# Patient Record
Sex: FEMALE | Race: WHITE
[De-identification: names, ages, dates, MRNs, and addresses within clinical notes are randomized per-mention and may not be internally consistent; named-entity substitution may affect disease eponyms.]

---

## 2017-07-10 ENCOUNTER — RECORD ABSTRACTING (OUTPATIENT)
Age: 57
End: 2017-07-10

## 2017-07-10 DIAGNOSIS — I65.22 OCCLUSION AND STENOSIS OF LEFT CAROTID ARTERY: ICD-10-CM

## 2017-07-10 DIAGNOSIS — Z77.22 CONTACT WITH AND (SUSPECTED) EXPOSURE TO ENVIRONMENTAL TOBACCO SMOKE (ACUTE) (CHRONIC): ICD-10-CM

## 2017-07-10 DIAGNOSIS — K63.5 POLYP OF COLON: ICD-10-CM

## 2017-07-10 DIAGNOSIS — R07.89 OTHER CHEST PAIN: ICD-10-CM

## 2017-07-11 ENCOUNTER — APPOINTMENT (OUTPATIENT)
Dept: INTERNAL MEDICINE | Facility: CLINIC | Age: 57
End: 2017-07-11

## 2017-07-11 VITALS
RESPIRATION RATE: 14 BRPM | WEIGHT: 125.99 LBS | SYSTOLIC BLOOD PRESSURE: 130 MMHG | HEART RATE: 90 BPM | DIASTOLIC BLOOD PRESSURE: 88 MMHG | HEIGHT: 66 IN | TEMPERATURE: 97.9 F | OXYGEN SATURATION: 96 % | BODY MASS INDEX: 20.25 KG/M2

## 2017-07-11 DIAGNOSIS — R00.2 PALPITATIONS: ICD-10-CM

## 2017-08-30 ENCOUNTER — APPOINTMENT (OUTPATIENT)
Dept: INTERNAL MEDICINE | Facility: CLINIC | Age: 57
End: 2017-08-30

## 2017-09-07 ENCOUNTER — APPOINTMENT (OUTPATIENT)
Dept: INTERNAL MEDICINE | Facility: CLINIC | Age: 57
End: 2017-09-07
Payer: COMMERCIAL

## 2017-09-07 VITALS
RESPIRATION RATE: 16 BRPM | WEIGHT: 126 LBS | OXYGEN SATURATION: 95 % | DIASTOLIC BLOOD PRESSURE: 82 MMHG | SYSTOLIC BLOOD PRESSURE: 120 MMHG | TEMPERATURE: 98.1 F | BODY MASS INDEX: 20.25 KG/M2 | HEIGHT: 66 IN | HEART RATE: 80 BPM

## 2017-09-07 DIAGNOSIS — Z78.9 OTHER SPECIFIED HEALTH STATUS: ICD-10-CM

## 2017-09-07 DIAGNOSIS — Z83.3 FAMILY HISTORY OF DIABETES MELLITUS: ICD-10-CM

## 2017-09-07 DIAGNOSIS — R06.02 SHORTNESS OF BREATH: ICD-10-CM

## 2017-09-07 DIAGNOSIS — I10 ESSENTIAL (PRIMARY) HYPERTENSION: ICD-10-CM

## 2017-09-07 PROCEDURE — 94729 DIFFUSING CAPACITY: CPT

## 2017-09-07 PROCEDURE — 94727 GAS DIL/WSHOT DETER LNG VOL: CPT

## 2017-09-07 PROCEDURE — 99214 OFFICE O/P EST MOD 30 MIN: CPT | Mod: 25

## 2017-09-07 PROCEDURE — 94060 EVALUATION OF WHEEZING: CPT

## 2017-10-30 ENCOUNTER — RX RENEWAL (OUTPATIENT)
Age: 57
End: 2017-10-30

## 2017-12-12 ENCOUNTER — APPOINTMENT (OUTPATIENT)
Dept: INTERNAL MEDICINE | Facility: CLINIC | Age: 57
End: 2017-12-12

## 2017-12-14 ENCOUNTER — RESULT CHARGE (OUTPATIENT)
Age: 57
End: 2017-12-14

## 2017-12-14 ENCOUNTER — APPOINTMENT (OUTPATIENT)
Dept: INTERNAL MEDICINE | Facility: CLINIC | Age: 57
End: 2017-12-14
Payer: COMMERCIAL

## 2017-12-14 VITALS
DIASTOLIC BLOOD PRESSURE: 82 MMHG | HEIGHT: 66 IN | RESPIRATION RATE: 14 BRPM | WEIGHT: 126 LBS | BODY MASS INDEX: 20.25 KG/M2 | SYSTOLIC BLOOD PRESSURE: 116 MMHG | HEART RATE: 97 BPM | OXYGEN SATURATION: 96 % | TEMPERATURE: 98.2 F

## 2017-12-14 DIAGNOSIS — F51.01 PRIMARY INSOMNIA: ICD-10-CM

## 2017-12-14 LAB — S PYO AG SPEC QL IA: NORMAL

## 2017-12-14 PROCEDURE — 99213 OFFICE O/P EST LOW 20 MIN: CPT | Mod: 25

## 2017-12-14 PROCEDURE — 87880 STREP A ASSAY W/OPTIC: CPT | Mod: QW

## 2018-02-20 ENCOUNTER — RX RENEWAL (OUTPATIENT)
Age: 58
End: 2018-02-20

## 2018-02-26 ENCOUNTER — OTHER (OUTPATIENT)
Age: 58
End: 2018-02-26

## 2018-03-01 ENCOUNTER — APPOINTMENT (OUTPATIENT)
Dept: INTERNAL MEDICINE | Facility: CLINIC | Age: 58
End: 2018-03-01
Payer: COMMERCIAL

## 2018-03-01 VITALS
HEIGHT: 66 IN | TEMPERATURE: 98 F | BODY MASS INDEX: 20.57 KG/M2 | SYSTOLIC BLOOD PRESSURE: 122 MMHG | DIASTOLIC BLOOD PRESSURE: 80 MMHG | RESPIRATION RATE: 16 BRPM | OXYGEN SATURATION: 95 % | WEIGHT: 128 LBS | HEART RATE: 92 BPM

## 2018-03-01 PROCEDURE — 99213 OFFICE O/P EST LOW 20 MIN: CPT | Mod: 25

## 2018-03-01 PROCEDURE — 99396 PREV VISIT EST AGE 40-64: CPT

## 2018-03-01 RX ORDER — AZITHROMYCIN DIHYDRATE 250 MG/1
250 TABLET, FILM COATED ORAL
Qty: 1 | Refills: 0 | Status: DISCONTINUED | COMMUNITY
Start: 2017-12-14 | End: 2018-03-01

## 2018-03-01 RX ORDER — B-COMPLEX WITH VITAMIN C
TABLET ORAL
Refills: 0 | Status: COMPLETED | COMMUNITY
End: 2018-03-01

## 2018-03-01 RX ORDER — COLD-HOT PACK
125 MCG EACH MISCELLANEOUS
Refills: 0 | Status: COMPLETED | COMMUNITY
End: 2018-03-01

## 2018-04-25 ENCOUNTER — APPOINTMENT (OUTPATIENT)
Dept: INTERNAL MEDICINE | Facility: CLINIC | Age: 58
End: 2018-04-25
Payer: COMMERCIAL

## 2018-04-25 VITALS
RESPIRATION RATE: 14 BRPM | WEIGHT: 128 LBS | HEART RATE: 78 BPM | HEIGHT: 66 IN | TEMPERATURE: 98.4 F | BODY MASS INDEX: 20.57 KG/M2 | DIASTOLIC BLOOD PRESSURE: 88 MMHG | SYSTOLIC BLOOD PRESSURE: 128 MMHG | OXYGEN SATURATION: 96 %

## 2018-04-25 LAB — S PYO AG SPEC QL IA: NORMAL

## 2018-04-25 PROCEDURE — 99213 OFFICE O/P EST LOW 20 MIN: CPT | Mod: 25

## 2018-04-25 PROCEDURE — 87880 STREP A ASSAY W/OPTIC: CPT | Mod: QW

## 2018-05-03 PROBLEM — Z87.09 HISTORY OF ACUTE PHARYNGITIS: Status: RESOLVED | Noted: 2018-04-25 | Resolved: 2018-05-03

## 2018-05-03 PROBLEM — Z87.828 HISTORY OF MOTOR VEHICLE ACCIDENT: Status: RESOLVED | Noted: 2017-07-10 | Resolved: 2018-05-03

## 2018-05-03 PROBLEM — Z87.09 HISTORY OF SINUSITIS: Status: RESOLVED | Noted: 2018-04-25 | Resolved: 2018-05-03

## 2018-05-03 PROBLEM — B00.9 HSV INFECTION: Status: ACTIVE | Noted: 2018-05-03

## 2018-05-03 RX ORDER — IBUPROFEN 600 MG/1
600 TABLET, FILM COATED ORAL
Qty: 20 | Refills: 0 | Status: COMPLETED | COMMUNITY
Start: 2018-03-05 | End: 2018-05-03

## 2018-05-03 RX ORDER — AMOXICILLIN 500 MG/1
500 CAPSULE ORAL
Qty: 20 | Refills: 0 | Status: COMPLETED | COMMUNITY
Start: 2018-03-05 | End: 2018-05-03

## 2018-05-03 RX ORDER — CHLORHEXIDINE GLUCONATE, 0.12% ORAL RINSE 1.2 MG/ML
0.12 SOLUTION DENTAL
Qty: 473 | Refills: 0 | Status: COMPLETED | COMMUNITY
Start: 2018-03-05 | End: 2018-05-03

## 2018-05-03 RX ORDER — AZITHROMYCIN 250 MG/1
250 TABLET, FILM COATED ORAL
Qty: 1 | Refills: 0 | Status: COMPLETED | COMMUNITY
Start: 2018-04-25 | End: 2018-05-03

## 2018-05-04 ENCOUNTER — NON-APPOINTMENT (OUTPATIENT)
Age: 58
End: 2018-05-04

## 2018-05-04 ENCOUNTER — APPOINTMENT (OUTPATIENT)
Dept: INTERNAL MEDICINE | Facility: CLINIC | Age: 58
End: 2018-05-04
Payer: COMMERCIAL

## 2018-05-04 ENCOUNTER — OTHER (OUTPATIENT)
Age: 58
End: 2018-05-04

## 2018-05-04 VITALS
BODY MASS INDEX: 20.25 KG/M2 | SYSTOLIC BLOOD PRESSURE: 100 MMHG | HEIGHT: 66 IN | TEMPERATURE: 99 F | OXYGEN SATURATION: 97 % | WEIGHT: 125.99 LBS | HEART RATE: 70 BPM | RESPIRATION RATE: 16 BRPM | DIASTOLIC BLOOD PRESSURE: 60 MMHG

## 2018-05-04 DIAGNOSIS — B00.9 HERPESVIRAL INFECTION, UNSPECIFIED: ICD-10-CM

## 2018-05-04 DIAGNOSIS — M27.63: ICD-10-CM

## 2018-05-04 DIAGNOSIS — Z87.828 PERSONAL HISTORY OF OTHER (HEALED) PHYSICAL INJURY AND TRAUMA: ICD-10-CM

## 2018-05-04 DIAGNOSIS — R59.0 LOCALIZED ENLARGED LYMPH NODES: ICD-10-CM

## 2018-05-04 DIAGNOSIS — Z87.09 PERSONAL HISTORY OF OTHER DISEASES OF THE RESPIRATORY SYSTEM: ICD-10-CM

## 2018-05-04 PROCEDURE — 94060 EVALUATION OF WHEEZING: CPT

## 2018-05-04 PROCEDURE — 99214 OFFICE O/P EST MOD 30 MIN: CPT | Mod: 25

## 2018-08-03 ENCOUNTER — NON-APPOINTMENT (OUTPATIENT)
Age: 58
End: 2018-08-03

## 2018-08-03 ENCOUNTER — APPOINTMENT (OUTPATIENT)
Dept: INTERNAL MEDICINE | Facility: CLINIC | Age: 58
End: 2018-08-03
Payer: COMMERCIAL

## 2018-08-03 VITALS
TEMPERATURE: 98.6 F | RESPIRATION RATE: 16 BRPM | OXYGEN SATURATION: 97 % | HEIGHT: 66 IN | BODY MASS INDEX: 20.57 KG/M2 | DIASTOLIC BLOOD PRESSURE: 82 MMHG | SYSTOLIC BLOOD PRESSURE: 120 MMHG | HEART RATE: 104 BPM | WEIGHT: 128 LBS

## 2018-08-03 DIAGNOSIS — J45.909 UNSPECIFIED ASTHMA, UNCOMPLICATED: ICD-10-CM

## 2018-08-03 PROCEDURE — 99214 OFFICE O/P EST MOD 30 MIN: CPT | Mod: 25

## 2018-08-03 PROCEDURE — 94060 EVALUATION OF WHEEZING: CPT

## 2018-08-03 RX ORDER — ALPRAZOLAM 0.25 MG/1
0.25 TABLET ORAL DAILY
Qty: 30 | Refills: 0 | Status: DISCONTINUED | COMMUNITY
Start: 2018-05-11 | End: 2018-08-03

## 2018-08-03 RX ORDER — AMOXICILLIN 500 MG/1
500 CAPSULE ORAL 3 TIMES DAILY
Qty: 21 | Refills: 0 | Status: COMPLETED | COMMUNITY
Start: 2018-05-04 | End: 2018-08-03

## 2018-08-03 RX ORDER — TRAZODONE HYDROCHLORIDE 50 MG/1
50 TABLET ORAL
Qty: 30 | Refills: 5 | Status: DISCONTINUED | COMMUNITY
Start: 2017-12-14 | End: 2018-08-03

## 2018-08-03 RX ORDER — VALACYCLOVIR 1 G/1
1 TABLET, FILM COATED ORAL
Qty: 10 | Refills: 0 | Status: DISCONTINUED | COMMUNITY
Start: 2018-04-25 | End: 2018-08-03

## 2018-08-03 NOTE — REVIEW OF SYSTEMS
[Fever] : no fever [Chills] : no chills [Feeling Poorly] : not feeling poorly [Feeling Tired] : not feeling tired [Recent Weight Loss (___ Lbs)] : no recent weight loss [Eyesight Problems] : no eyesight problems [Nosebleeds] : no nosebleeds [Nasal Discharge] : no nasal discharge [Sore Throat] : no sore throat [Hoarseness] : no hoarseness [Chest Pain] : no chest pain [Palpitations] : no palpitations [Leg Claudication] : no intermittent leg claudication [Lower Ext Edema] : no lower extremity edema [see HPI] : see HPI [Wheezing] : wheezing [Cough] : cough [SOB on Exertion] : shortness of breath during exertion [Orthopnea] : no orthopnea [PND] : no PND [Abdominal Pain] : no abdominal pain [Heartburn] : no heartburn [Melena] : no melena [Dysuria] : no dysuria [Incontinence] : no incontinence [Arthralgias] : no arthralgias [Joint Swelling] : no joint swelling [Joint Stiffness] : no joint stiffness [Skin Lesions] : no skin lesions [Skin Wound] : no skin wound [Dizziness] : no dizziness [Fainting] : no fainting [Sleep Disturbances] : no sleep disturbances [Anxiety] : anxiety [Depression] : no depression [Muscle Weakness] : no muscle weakness [Easy Bleeding] : no tendency for easy bleeding [Easy Bruising] : no tendency for easy bruising [Swollen Glands] : swollen glands [Swollen Glands In The Neck] : swollen glands in the neck [Negative] : Heme/Lymph

## 2018-08-03 NOTE — PLAN
[FreeTextEntry1] : She does not require acute treatment for asthma.\par She will f/u with Dr Han next week as scheduled.\par She will  2012 chest CT from [facility] and take to [facility] with paper report and  RX requesting addendum to neck CT regarding upper chest findings.\par She will get neck CT on CD from [facility].\par She may require dedicated chest CT after addendum. She will bring neck CT CD to [facility] if so.\par She will have labs per ENT including quantiferon gold.\par Increase Advair to BID.\par Flonase and Astepro 2 squirts each QD AM. Take Astepro first.\par Continue routine medications.\par She will call if worse/ not improved.\par F/U 1 month as scheduled with PFT.\par She must schedule colonoscopy and GYN testing.\par

## 2018-08-03 NOTE — DATA REVIEWED
[FreeTextEntry1] : 5-31-18 Neck CT reviewed.\par A pre-and post spirogram was performed today. This reveals mild obstruction with mild airway raectivity.\par

## 2018-08-03 NOTE — HISTORY OF PRESENT ILLNESS
[FreeTextEntry8] : Asthma, seasonal allergies and neck mass.\par \par The patient has been taking Advair only QD with 1 month of dry cough and mild exertional wheeze as well as nasal congestion. ENT, Dr Han,  has prescribed Dymysta.with good effect but this is not covered by ins. She is using Flonase but this is not as effective. She denies fever, chills, chest pain or discolored sputum. "I'm not sick."\par \par She continues to be followed by Dr Han for left neck mass, now confirmed as lymphadenopathy. She states this "lump varies from almost golf ball sizes to pea sized. It is not painful and present for 3-4 mos. Biopsy was performed as was neck CT. She sought additional ENT opinion and testing for TB has been recommended. Neck CT from [facility] returned with pulmonary nodules and possible hilar adenopathy but not compared to prior chest CT from [facility].\par \par She is anxious about these issues and has postponed colonoscopy and GYN testing.

## 2018-08-03 NOTE — PHYSICAL EXAM
[General Appearance - Alert] : alert [General Appearance - In No Acute Distress] : in no acute distress [General Appearance - Well Nourished] : well nourished [General Appearance - Well Developed] : well developed [General Appearance - Well-Appearing] : healthy appearing [Sclera] : the sclera and conjunctiva were normal [PERRL With Normal Accommodation] : pupils were equal in size, round, and reactive to light [Strabismus] : no strabismus was seen [Outer Ear] : the ears and nose were normal in appearance [Hearing Threshold Finger Rub Not [place]] : hearing was normal [Examination Of The Oral Cavity] : the lips and gums were normal [Both Tympanic Membranes Were Examined] : both tympanic membranes were normal [Neck Appearance] : the appearance of the neck was normal [Jugular Venous Distention Increased] : there was no jugular-venous distention [Thyroid Diffuse Enlargement] : the thyroid was not enlarged [Respiration, Rhythm And Depth] : normal respiratory rhythm and effort [Exaggerated Use Of Accessory Muscles For Inspiration] : no accessory muscle use [Auscultation Breath Sounds / Voice Sounds] : lungs were clear to auscultation bilaterally [Heart Rate And Rhythm] : heart rate was normal and rhythm regular [Heart Sounds] : normal S1 and S2 [Heart Sounds Gallop] : no gallops [Systolic grade ___/6] : A grade [unfilled]/6 systolic murmur was heard. [Edema] : there was no peripheral edema [Abdomen Soft] : soft [Cervical Lymph Nodes Enlarged Posterior Bilaterally] : posterior cervical [Supraclavicular Lymph Nodes Enlarged Bilaterally] : supraclavicular [Axillary Lymph Nodes Enlarged Bilaterally] : axillary [Abnormal Walk] : normal gait [Nail Clubbing] : no clubbing  or cyanosis of the fingernails [Musculoskeletal - Swelling] : no joint swelling seen [Skin Color & Pigmentation] : normal skin color and pigmentation [Skin Turgor] : normal skin turgor [] : no rash [No Focal Deficits] : no focal deficits [Impaired Insight] : insight and judgment were intact [Affect] : the affect was normal [FreeTextEntry1] : anxious

## 2018-08-03 NOTE — COUNSELING
[Healthy eating counseling provided] : healthy eating [Activity counseling provided] : activity [Good understanding] : Patient has a good understanding of lifestyle changes and the steps needed to achieve self management goals

## 2018-08-08 ENCOUNTER — RX RENEWAL (OUTPATIENT)
Age: 58
End: 2018-08-08

## 2018-08-17 ENCOUNTER — NON-APPOINTMENT (OUTPATIENT)
Age: 58
End: 2018-08-17

## 2018-08-17 ENCOUNTER — APPOINTMENT (OUTPATIENT)
Dept: INTERNAL MEDICINE | Facility: CLINIC | Age: 58
End: 2018-08-17
Payer: COMMERCIAL

## 2018-08-17 VITALS
HEART RATE: 98 BPM | SYSTOLIC BLOOD PRESSURE: 118 MMHG | DIASTOLIC BLOOD PRESSURE: 60 MMHG | WEIGHT: 127 LBS | TEMPERATURE: 99.5 F | BODY MASS INDEX: 20.41 KG/M2 | HEIGHT: 66 IN | OXYGEN SATURATION: 98 % | RESPIRATION RATE: 16 BRPM

## 2018-08-17 DIAGNOSIS — R03.0 ELEVATED BLOOD-PRESSURE READING, W/OUT DIAGNOSIS OF HYPERTENSION: ICD-10-CM

## 2018-08-17 PROCEDURE — 94060 EVALUATION OF WHEEZING: CPT

## 2018-08-17 PROCEDURE — 99214 OFFICE O/P EST MOD 30 MIN: CPT | Mod: 25

## 2018-08-17 NOTE — ASSESSMENT
[Patient Optimized for Surgery] : Patient optimized for surgery [No Further Testing Recommended] : no further testing recommended [Continue medications as is] : Continue current medications [As per surgery] : as per surgery [FreeTextEntry4] : There is no absolute contraindication to procedure.\par Holding NSAIDs, ASA, vitamins and fish oil 7 days before surgery.\par DVT prophylaxis, close airway monitoring and O2 sat monitoring is required.\par \par \par

## 2018-08-17 NOTE — REVIEW OF SYSTEMS
[see HPI] : see HPI [Cough] : cough [SOB on Exertion] : shortness of breath during exertion [Anxiety] : anxiety [Swollen Glands In The Neck] : swollen glands in the neck [Negative] : Heme/Lymph [Fever] : no fever [Chills] : no chills [Feeling Poorly] : not feeling poorly [Feeling Tired] : not feeling tired [Recent Weight Loss (___ Lbs)] : no recent weight loss [Eyesight Problems] : no eyesight problems [Nosebleeds] : no nosebleeds [Nasal Discharge] : no nasal discharge [Sore Throat] : no sore throat [Hoarseness] : no hoarseness [Chest Pain] : no chest pain [Palpitations] : no palpitations [Leg Claudication] : no intermittent leg claudication [Lower Ext Edema] : no lower extremity edema [Wheezing] : no wheezing [Orthopnea] : no orthopnea [PND] : no PND [Abdominal Pain] : no abdominal pain [Vomiting] : no vomiting [Diarrhea] : no diarrhea [Heartburn] : no heartburn [Melena] : no melena [Dysuria] : no dysuria [Incontinence] : no incontinence [Arthralgias] : no arthralgias [Joint Swelling] : no joint swelling [Joint Stiffness] : no joint stiffness [Skin Lesions] : no skin lesions [Itching] : no itching [Dizziness] : no dizziness [Fainting] : no fainting [Sleep Disturbances] : no sleep disturbances [Depression] : no depression [Muscle Weakness] : no muscle weakness [Easy Bleeding] : no tendency for easy bleeding [Easy Bruising] : no tendency for easy bruising

## 2018-08-17 NOTE — HISTORY OF PRESENT ILLNESS
[No Pertinent Cardiac History] : no history of aortic stenosis, atrial fibrillation, coronary artery disease, recent myocardial infarction, or implantable device/pacemaker [Asthma] : asthma [No Adverse Anesthesia Reaction] : no adverse anesthesia reaction in self or family member [(Patient denies any chest pain, claudication, dyspnea on exertion, orthopnea, palpitations or syncope)] : Patient denies any chest pain, claudication, dyspnea on exertion, orthopnea, palpitations or syncope [COPD] : no COPD [Sleep Apnea] : no sleep apnea [Smoker] : not a smoker [Chronic Anticoagulation] : no chronic anticoagulation [Chronic Kidney Disease] : no chronic kidney disease [Diabetes] : no diabetes [FreeTextEntry1] : Excisional biopsy left submandibular mass [FreeTextEntry2] : 8-23-18 [FreeTextEntry3] : Dr Yuan at [facility]. [FreeTextEntry4] : "I am having this lump removed."\par She continues to be followed by Dr Yuan for left neck mass and this is reportedly confirmed as lymphadenopathy. This is bothersome to the patient and causes severe anxiety as she fears "something bad is in there." The lesion varies from almost golf ball sizes to pea sized and it is not painful This has been present for more than 4 mos. Biopsy was performed as was neck CT. Dr Yuan has recommended and scheduled excisional biopsy for next week.\par GERD is controlled with Zantac and she has been compliant with Advair BID now. There is no voice change, stridor, dental disease or dysphagia.\par \par Neck CT at  Radiology was interpreted with pulmonary nodules and eventual comparison to prior chest CT scans lead to addendum with reported slight increase in size of upper lung field nodules. She is scheduling dedicated chest CT for the near future. She feels well otherwise and completed cardiology testing earlier this year. [FreeTextEntry7] : Performed earlier this year

## 2018-08-17 NOTE — PLAN
[FreeTextEntry1] : Take Ranitidine, Advair and nasal sprays AM of surgery.\par Maintain proper hydration.\par CT chest HR NC at [facility] few days before next visit.\par She will call with any decomp in status.\par F/U as scheduled or sooner PRN.\par

## 2018-08-17 NOTE — COUNSELING
[Weight management counseling provided] : Weight management [Healthy eating counseling provided] : healthy eating [Activity counseling provided] : activity [Behavioral health counseling provided] : behavioral health  [Engage in a relaxing activity] : Engage in a relaxing activity [Good understanding] : Patient has a good understanding of lifestyle changes and the steps needed to achieve self management goals

## 2018-08-17 NOTE — PHYSICAL EXAM
[General Appearance - Alert] : alert [General Appearance - In No Acute Distress] : in no acute distress [General Appearance - Well Nourished] : well nourished [General Appearance - Well Developed] : well developed [General Appearance - Well-Appearing] : healthy appearing [Sclera] : the sclera and conjunctiva were normal [PERRL With Normal Accommodation] : pupils were equal in size, round, and reactive to light [Strabismus] : no strabismus was seen [Outer Ear] : the ears and nose were normal in appearance [Hearing Threshold Finger Rub Not [place]] : hearing was normal [Examination Of The Oral Cavity] : the lips and gums were normal [Both Tympanic Membranes Were Examined] : both tympanic membranes were normal [Jugular Venous Distention Increased] : there was no jugular-venous distention [Thyroid Diffuse Enlargement] : the thyroid was not enlarged [Respiration, Rhythm And Depth] : normal respiratory rhythm and effort [Exaggerated Use Of Accessory Muscles For Inspiration] : no accessory muscle use [Auscultation Breath Sounds / Voice Sounds] : lungs were clear to auscultation bilaterally [Heart Rate And Rhythm] : heart rate was normal and rhythm regular [Heart Sounds] : normal S1 and S2 [Heart Sounds Gallop] : no gallops [Systolic grade ___/6] : A grade [unfilled]/6 systolic murmur was heard. [Edema] : there was no peripheral edema [Abdomen Soft] : soft [Cervical Lymph Nodes Enlarged Posterior Bilaterally] : posterior cervical [Supraclavicular Lymph Nodes Enlarged Bilaterally] : supraclavicular [Abnormal Walk] : normal gait [Nail Clubbing] : no clubbing  or cyanosis of the fingernails [Musculoskeletal - Swelling] : no joint swelling seen [Skin Color & Pigmentation] : normal skin color and pigmentation [Skin Turgor] : normal skin turgor [] : no rash [No Focal Deficits] : no focal deficits [Impaired Insight] : insight and judgment were intact [Affect] : the affect was normal [Abdomen Tenderness] : non-tender [Motor Tone] : muscle strength and tone were normal [Skin Lesions] : no skin lesions [Oriented To Time, Place, And Person] : oriented to person, place, and time [Memory Recent] : recent memory was not impaired [Memory Remote] : remote memory was not impaired [FreeTextEntry1] : anxious

## 2018-08-17 NOTE — RESULTS/DATA
[] : results reviewed [de-identified] : unremarkable [de-identified] : unremarkable [de-identified] : unremarkable [de-identified] : unremarkable with chronic "fibronodular" findings. [de-identified] : Sinus tach at 105 bpm, normal axis and intervals, good R wave progression V1-6 w/o acute ST-T abn. No change c/w 2-27-17 EKG. [de-identified] : 8-15-18 pre-op testing as above.\par \par A pre-and post spirogram was performed today. This reveals mild obstruction with mild airway reactivity.\par

## 2018-08-31 ENCOUNTER — RX RENEWAL (OUTPATIENT)
Age: 58
End: 2018-08-31

## 2018-09-06 ENCOUNTER — APPOINTMENT (OUTPATIENT)
Dept: INTERNAL MEDICINE | Facility: CLINIC | Age: 58
End: 2018-09-06
Payer: COMMERCIAL

## 2018-09-06 VITALS
HEART RATE: 102 BPM | BODY MASS INDEX: 20.41 KG/M2 | DIASTOLIC BLOOD PRESSURE: 64 MMHG | TEMPERATURE: 98.8 F | HEIGHT: 66 IN | WEIGHT: 127 LBS | SYSTOLIC BLOOD PRESSURE: 112 MMHG | OXYGEN SATURATION: 98 % | RESPIRATION RATE: 16 BRPM

## 2018-09-06 VITALS
HEART RATE: 102 BPM | TEMPERATURE: 98.8 F | SYSTOLIC BLOOD PRESSURE: 112 MMHG | RESPIRATION RATE: 16 BRPM | DIASTOLIC BLOOD PRESSURE: 64 MMHG

## 2018-09-06 DIAGNOSIS — R22.1 LOCALIZED SWELLING, MASS AND LUMP, NECK: ICD-10-CM

## 2018-09-06 PROCEDURE — ZZZZZ: CPT

## 2018-09-06 PROCEDURE — 99214 OFFICE O/P EST MOD 30 MIN: CPT | Mod: 25

## 2018-09-06 PROCEDURE — 94729 DIFFUSING CAPACITY: CPT

## 2018-09-06 PROCEDURE — 94727 GAS DIL/WSHOT DETER LNG VOL: CPT

## 2018-09-06 PROCEDURE — 94060 EVALUATION OF WHEEZING: CPT

## 2018-09-06 NOTE — PLAN
[FreeTextEntry1] : 1. Continued medications as outlined above.\par \par 2. We'll now increase the strength of the Advair from 100/50 mcg, to 250/50, one puff b.i.d., do to the decreased flow rates and increased airway reactivity demonstrated.\par \par 3. The patient will obtain a baseline sedimentation rate and serum angiotensin converting enzyme level today.\par \par 4. Thyroid sonography will be performed to evaluate the thyroid nodules that were seen on a CAT scan of the neck.\par \par 5. Routine ENT followup\par \par 6. Flu shot will be obtained in the next several weeks\par \par 7. Colonoscopy is due in January of 2019\par \par 8. Follow up with myself in 6 months with a wellness evaluation and all yearly routine fasting blood work. She'll be scheduled for repeat CAT scan of the chest at that time, which will be performed in September of 2019.

## 2018-09-06 NOTE — HISTORY OF PRESENT ILLNESS
[de-identified] : The patient comes in today for a followup evaluation, and reassessment. There are several issues to discuss.\par \par The patient has recently undergone an evaluation for a swollen submandibular lymph node. She noted the swelling several months ago. She was initially seen in the office in April for sore throat, and subsequently with a swollen lymph node. She was undergoing dental work at the time. She was treated with antibiotics without any significant improvement. She was then sent to an ear nose and throat physician who told her to wait to see if it got smaller. After approximately 2 months, there was no significant change in the size of the lymph node, and she eventually underwent excision of the lymph node 2 weeks ago. The pathology came back as a noncaseating granuloma which is entirely replacing the lymph node. She now comes in for an assessment.\par \par The patient denies any fevers chills or night sweats. She denies any visual type changes. There is no bony pain. Of note is the fact that she did have an echocardiogram performed back in the spring, on a routine basis, due to her history of anxiety and palpitations.\par \par With regards her underlying asthma and airway reactivity, she continues with her Advair 100/50, one puff twice a day. She denies any wheezing. Abdomen no allergy mediated symptoms. She has not been exercising. There were no other acute constitutional symptoms. She now comes in for this assessment.\par \par

## 2018-09-06 NOTE — PHYSICAL EXAM
[General Appearance - Alert] : alert [General Appearance - In No Acute Distress] : in no acute distress [Outer Ear] : the ears and nose were normal in appearance [Oropharynx] : the oropharynx was normal [Neck Appearance] : the appearance of the neck was normal [Neck Cervical Mass (___cm)] : no neck mass was observed [Jugular Venous Distention Increased] : there was no jugular-venous distention [Thyroid Diffuse Enlargement] : the thyroid was not enlarged [Thyroid Nodule] : there were no palpable thyroid nodules [Auscultation Breath Sounds / Voice Sounds] : lungs were clear to auscultation bilaterally [Heart Rate And Rhythm] : heart rate was normal and rhythm regular [Heart Sounds] : normal S1 and S2 [Heart Sounds Gallop] : no gallops [Murmurs] : no murmurs [Heart Sounds Pericardial Friction Rub] : no pericardial rub [Arterial Pulses Carotid] : carotid pulses were normal with no bruits [Edema] : there was no peripheral edema [Bowel Sounds] : normal bowel sounds [Abdomen Soft] : soft [Abdomen Tenderness] : non-tender [] : no hepato-splenomegaly [Abdomen Mass (___ Cm)] : no abdominal mass palpated [Cervical Lymph Nodes Enlarged Posterior Bilaterally] : posterior cervical [Cervical Lymph Nodes Enlarged Anterior Bilaterally] : anterior cervical [Supraclavicular Lymph Nodes Enlarged Bilaterally] : supraclavicular [Nail Clubbing] : no clubbing  or cyanosis of the fingernails

## 2018-09-06 NOTE — DATA REVIEWED
[FreeTextEntry1] : A pulmonary function test is performed. Lung volumes are within normal limits with a slight decrease in the FRC. Lung mechanics reveal a mild decrease in flow rates with moderate to significant bronchodilator reactivity demonstrated. The DLCO is at the low limit of normal. Saturation is maintained. This represents a mild degree of obstruction with air reactivity. This is consistent with patient's clinical diagnosis of asthma. Minimal restriction may be present given the decrease in the FRC.\par \par CAT scan of the chest formed on 9/4/18 is reviewed. The patient is noted to have areas of nodularity with mild interstitial type changes more so in the right midlung zone and the right lower lobe. There are calcified lymph nodes and slightly enlarged mediastinal nodes. The changes are felt to be consistent with sarcoidosis.\par \par

## 2018-10-04 LAB
CHOLEST SERPL-MCNC: 186
GLUCOSE SERPL-MCNC: 97
HDLC SERPL-MCNC: 47
LIPID PNL WITH DIRECT LDL SERPL: 109

## 2018-10-17 ENCOUNTER — APPOINTMENT (OUTPATIENT)
Dept: INTERNAL MEDICINE | Facility: CLINIC | Age: 58
End: 2018-10-17
Payer: COMMERCIAL

## 2018-10-17 ENCOUNTER — MED ADMIN CHARGE (OUTPATIENT)
Age: 58
End: 2018-10-17

## 2018-10-17 PROCEDURE — G0008: CPT

## 2018-10-17 PROCEDURE — 90686 IIV4 VACC NO PRSV 0.5 ML IM: CPT

## 2018-10-30 ENCOUNTER — APPOINTMENT (OUTPATIENT)
Dept: INTERNAL MEDICINE | Facility: CLINIC | Age: 58
End: 2018-10-30
Payer: COMMERCIAL

## 2018-10-30 VITALS
TEMPERATURE: 98.6 F | RESPIRATION RATE: 16 BRPM | DIASTOLIC BLOOD PRESSURE: 82 MMHG | OXYGEN SATURATION: 97 % | HEART RATE: 96 BPM | WEIGHT: 128 LBS | BODY MASS INDEX: 20.57 KG/M2 | HEIGHT: 66 IN | SYSTOLIC BLOOD PRESSURE: 118 MMHG

## 2018-10-30 PROCEDURE — 99213 OFFICE O/P EST LOW 20 MIN: CPT

## 2018-10-30 NOTE — PHYSICAL EXAM
[No Acute Distress] : no acute distress [Well Nourished] : well nourished [Supple] : supple [No Lymphadenopathy] : no lymphadenopathy [No Respiratory Distress] : no respiratory distress  [Clear to Auscultation] : lungs were clear to auscultation bilaterally [No Accessory Muscle Use] : no accessory muscle use [Normal Rate] : normal rate  [Regular Rhythm] : with a regular rhythm [Normal S1, S2] : normal S1 and S2 [Normal Posterior Cervical Nodes] : no posterior cervical lymphadenopathy [Normal Anterior Cervical Nodes] : no anterior cervical lymphadenopathy [Normal Gait] : normal gait [Coordination Grossly Intact] : coordination grossly intact [No Focal Deficits] : no focal deficits [Normal Affect] : the affect was normal [Alert and Oriented x3] : oriented to person, place, and time [Normal Insight/Judgement] : insight and judgment were intact [de-identified] : frontal and maxillary sinus tenderness on palpation . left TM slight bulge,

## 2018-10-30 NOTE — HISTORY OF PRESENT ILLNESS
[FreeTextEntry8] : Pt presents  to the office today with complaints of frontal sinus pain and headaches x 2 weeks. She does have environmental allergies this time of year and uses Flonase. her nasal congestion is clear. She states her ears "feel clogged " . She is concerned as she is taking a plane trip in 3 days . She has a history of bronchiectasis and  takes Advair daily. She has been using Afrin nasal spray to dry up her mucous. \par She denies fever, chills, shortness of breath, wheezing , pleuritic pain, cough  or hemoptysis. \par

## 2018-10-30 NOTE — REVIEW OF SYSTEMS
[Fatigue] : fatigue [Earache] : earache [Nasal Discharge] : nasal discharge [Negative] : Heme/Lymph [Fever] : no fever [Chills] : no chills [Shortness Of Breath] : no shortness of breath [Wheezing] : no wheezing [Cough] : no cough [Dyspnea on Exertion] : no dyspnea on exertion [FreeTextEntry4] : see HPI

## 2018-10-30 NOTE — ASSESSMENT
[FreeTextEntry1] : Augmentin 875- 125 mg po BID  x 7 days \par Continue current meds\par OTC analgesia for headache\par Afrin nasal decongestant spray PRN for plane \par pt refused Medrol dose pack \par Call if sx's not improving \par To ER with emergent symptoms \par

## 2019-01-24 ENCOUNTER — RX RENEWAL (OUTPATIENT)
Age: 59
End: 2019-01-24

## 2019-02-26 ENCOUNTER — CHART COPY (OUTPATIENT)
Age: 59
End: 2019-02-26

## 2019-03-04 ENCOUNTER — MEDICATION RENEWAL (OUTPATIENT)
Age: 59
End: 2019-03-04

## 2019-03-15 ENCOUNTER — APPOINTMENT (OUTPATIENT)
Dept: INTERNAL MEDICINE | Facility: CLINIC | Age: 59
End: 2019-03-15
Payer: COMMERCIAL

## 2019-03-15 VITALS
DIASTOLIC BLOOD PRESSURE: 82 MMHG | OXYGEN SATURATION: 99 % | SYSTOLIC BLOOD PRESSURE: 122 MMHG | WEIGHT: 125 LBS | HEART RATE: 96 BPM | RESPIRATION RATE: 16 BRPM | BODY MASS INDEX: 20.09 KG/M2 | HEIGHT: 66 IN | TEMPERATURE: 98.7 F

## 2019-03-15 PROCEDURE — 99396 PREV VISIT EST AGE 40-64: CPT

## 2019-03-15 RX ORDER — AMOXICILLIN AND CLAVULANATE POTASSIUM 875; 125 MG/1; MG/1
875-125 TABLET, COATED ORAL
Qty: 14 | Refills: 0 | Status: DISCONTINUED | COMMUNITY
Start: 2018-10-30 | End: 2019-03-15

## 2019-03-15 RX ORDER — FLUTICASONE PROPIONATE 50 UG/1
50 SPRAY, METERED NASAL DAILY
Qty: 1 | Refills: 5 | Status: DISCONTINUED | COMMUNITY
Start: 2018-08-03 | End: 2019-03-15

## 2019-03-15 NOTE — HEALTH RISK ASSESSMENT
[Patient reported mammogram was normal] : Patient reported mammogram was normal [Smoke Detector] : smoke detector [Carbon Monoxide Detector] : carbon monoxide detector [Seat Belt] :  uses seat belt [Sunscreen] : uses sunscreen [Patient reported PAP Smear was normal] : Patient reported PAP Smear was normal [Patient reported colonoscopy was normal] : Patient reported colonoscopy was normal [] : No [de-identified] : occasional [Guns at Home] : no guns at home [MammogramDate] : 01/19 [PapSmearDate] : 01/19 [ColonoscopyDate] : 01/14 [ColonoscopyComments] : next one scheduled for 04/2019

## 2019-03-15 NOTE — PHYSICAL EXAM
[General Appearance - Alert] : alert [General Appearance - In No Acute Distress] : in no acute distress [Sclera] : the sclera and conjunctiva were normal [PERRL With Normal Accommodation] : pupils were equal in size, round, and reactive to light [Extraocular Movements] : extraocular movements were intact [Outer Ear] : the ears and nose were normal in appearance [Oropharynx] : the oropharynx was normal [Neck Appearance] : the appearance of the neck was normal [Neck Cervical Mass (___cm)] : no neck mass was observed [Jugular Venous Distention Increased] : there was no jugular-venous distention [Thyroid Diffuse Enlargement] : the thyroid was not enlarged [Thyroid Nodule] : there were no palpable thyroid nodules [Auscultation Breath Sounds / Voice Sounds] : lungs were clear to auscultation bilaterally [Heart Rate And Rhythm] : heart rate was normal and rhythm regular [Heart Sounds] : normal S1 and S2 [Heart Sounds Gallop] : no gallops [Murmurs] : no murmurs [Heart Sounds Pericardial Friction Rub] : no pericardial rub [Arterial Pulses Carotid] : carotid pulses were normal with no bruits [Edema] : there was no peripheral edema [Bowel Sounds] : normal bowel sounds [Abdomen Soft] : soft [Abdomen Tenderness] : non-tender [] : no hepato-splenomegaly [Abdomen Mass (___ Cm)] : no abdominal mass palpated [Cervical Lymph Nodes Enlarged Posterior Bilaterally] : posterior cervical [Cervical Lymph Nodes Enlarged Anterior Bilaterally] : anterior cervical [Supraclavicular Lymph Nodes Enlarged Bilaterally] : supraclavicular [No CVA Tenderness] : no ~M costovertebral angle tenderness [Nail Clubbing] : no clubbing  or cyanosis of the fingernails [FreeTextEntry1] : Seborrheic keratoses are present

## 2019-03-15 NOTE — HISTORY OF PRESENT ILLNESS
[de-identified] : The patient comes in today for a wellness evaluation.\par \par Overall, the patient states that she is doing extremely well. She has not been exercising recently. Her weight has been stable. She has been compliant with her medications. She continues on Advair. She has not had any cough wheeze or sputum production.\par \par The patient has seen several subspecialists, including her ENT physician. She was noted to have multiple tiny thyroid nodules which are being followed carefully. She is scheduled to undergo a repeat thyroid sonogram in May and she will followup with her ENT physician at that time.\par \par The patient was seen by her cardiologist as per my recommendation. She will undergo an echocardiogram in the near future. She was also seen by her gastroenterologist and she is undergoing a colonoscopy in April. She still has some reflux symptoms but they appear to be fairly well-controlled on Zantac. She denies any change in bowel habits. There are no other constitutional symptoms. She now comes in for this assessment.

## 2019-03-15 NOTE — PLAN
[FreeTextEntry1] : 1. Continue with medication as outlined above.\par \par 2. The new shingles vaccine needs to be considered. She will contact her insurance company to determine coverage. She will consider it at the next visit.\par \par 3. Magnesium 400 mg daily will be administered for leg cramps.\par \par 4. Repeat CAT scan of the chest be performed in September of 2019.\par \par 5. Routine ENT followup in May with repeat sonogram to reevaluate the small high lateral heterogeneous nodules\par \par 6. Follow up with myself in 6 months with full pulmonary function testing, EKG and review of the most recent CAT scan of the chest. Will consider shingles vaccine at that visit\par \par 7. Exercise regimen has been recommended.

## 2019-04-29 ENCOUNTER — RX RENEWAL (OUTPATIENT)
Age: 59
End: 2019-04-29

## 2019-07-31 ENCOUNTER — MEDICATION RENEWAL (OUTPATIENT)
Age: 59
End: 2019-07-31

## 2019-09-04 ENCOUNTER — RX RENEWAL (OUTPATIENT)
Age: 59
End: 2019-09-04

## 2019-09-05 ENCOUNTER — APPOINTMENT (OUTPATIENT)
Dept: INTERNAL MEDICINE | Facility: CLINIC | Age: 59
End: 2019-09-05
Payer: COMMERCIAL

## 2019-09-05 VITALS
HEART RATE: 98 BPM | DIASTOLIC BLOOD PRESSURE: 86 MMHG | RESPIRATION RATE: 18 BRPM | HEIGHT: 66 IN | SYSTOLIC BLOOD PRESSURE: 124 MMHG | TEMPERATURE: 99.1 F | OXYGEN SATURATION: 100 % | BODY MASS INDEX: 20.09 KG/M2 | WEIGHT: 125 LBS

## 2019-09-05 DIAGNOSIS — Z87.09 PERSONAL HISTORY OF OTHER DISEASES OF THE RESPIRATORY SYSTEM: ICD-10-CM

## 2019-09-05 LAB — S PYO AG SPEC QL IA: NEGATIVE

## 2019-09-05 PROCEDURE — 87880 STREP A ASSAY W/OPTIC: CPT | Mod: QW

## 2019-09-05 PROCEDURE — 99214 OFFICE O/P EST MOD 30 MIN: CPT | Mod: 25

## 2019-09-05 NOTE — PHYSICAL EXAM
[No Lymphadenopathy] : no lymphadenopathy [No Acute Distress] : no acute distress [Supple] : supple [Normal S1, S2] : normal S1 and S2 [Regular Rhythm] : with a regular rhythm [Coordination Grossly Intact] : coordination grossly intact [Normal Affect] : the affect was normal [Alert and Oriented x3] : oriented to person, place, and time [Normal Insight/Judgement] : insight and judgment were intact [Normal Outer Ear/Nose] : the outer ears and nose were normal in appearance [Normal TMs] : both tympanic membranes were normal [No Respiratory Distress] : no respiratory distress  [No Accessory Muscle Use] : no accessory muscle use [Clear to Auscultation] : lungs were clear to auscultation bilaterally [Normal Posterior Cervical Nodes] : no posterior cervical lymphadenopathy [Normal Anterior Cervical Nodes] : no anterior cervical lymphadenopathy [No Focal Deficits] : no focal deficits [Normal Gait] : normal gait [de-identified] : posterior pharnyx moderate erythema noted,

## 2019-09-05 NOTE — REVIEW OF SYSTEMS
[Sore Throat] : sore throat [Negative] : Heme/Lymph [Fever] : no fever [Earache] : no earache [Nasal Discharge] : no nasal discharge [Postnasal Drip] : no postnasal drip [Shortness Of Breath] : no shortness of breath [Wheezing] : no wheezing [Cough] : no cough [Dyspnea on Exertion] : no dyspnea on exertion

## 2019-09-05 NOTE — HISTORY OF PRESENT ILLNESS
[FreeTextEntry8] : Pt presents  to the office today with complaints of sore throat x 6 days . She reports feels like her" throat is raw.' She went to Urgent Care over the weekend , was told it was viral and to take OTC analgesia. She states feels no better and has a low grade fever 99.2F today. She has a history of bronchiectasis and uses Advair daily . She denies nasal congestion, cough, wheeze , shortness of breath or sputum production. \par

## 2019-09-05 NOTE — ASSESSMENT
[FreeTextEntry1] : Rapid strep negative\par Azithromycin Z-Ej , take as directed\par maintain hydration , rest\par Salt water gargles PRN\par Continue current medications\par Call / return if not improving \par

## 2019-10-18 ENCOUNTER — APPOINTMENT (OUTPATIENT)
Dept: INTERNAL MEDICINE | Facility: CLINIC | Age: 59
End: 2019-10-18

## 2019-10-25 ENCOUNTER — APPOINTMENT (OUTPATIENT)
Dept: INTERNAL MEDICINE | Facility: CLINIC | Age: 59
End: 2019-10-25
Payer: COMMERCIAL

## 2019-10-25 VITALS
RESPIRATION RATE: 16 BRPM | BODY MASS INDEX: 20.09 KG/M2 | DIASTOLIC BLOOD PRESSURE: 74 MMHG | OXYGEN SATURATION: 99 % | WEIGHT: 125 LBS | TEMPERATURE: 99 F | SYSTOLIC BLOOD PRESSURE: 112 MMHG | HEIGHT: 66 IN | HEART RATE: 100 BPM

## 2019-10-25 PROCEDURE — 99214 OFFICE O/P EST MOD 30 MIN: CPT | Mod: 25

## 2019-10-25 PROCEDURE — 94060 EVALUATION OF WHEEZING: CPT

## 2019-10-25 PROCEDURE — 94727 GAS DIL/WSHOT DETER LNG VOL: CPT

## 2019-10-25 PROCEDURE — 94729 DIFFUSING CAPACITY: CPT

## 2019-10-25 PROCEDURE — ZZZZZ: CPT

## 2019-10-25 RX ORDER — AZITHROMYCIN 250 MG/1
250 TABLET, FILM COATED ORAL
Qty: 1 | Refills: 0 | Status: DISCONTINUED | COMMUNITY
Start: 2019-09-05 | End: 2019-10-25

## 2019-10-25 RX ORDER — RANITIDINE 150 MG/1
150 TABLET ORAL TWICE DAILY
Qty: 60 | Refills: 5 | Status: DISCONTINUED | COMMUNITY
End: 2019-10-25

## 2019-10-25 RX ORDER — AZELASTINE HYDROCHLORIDE 205.5 UG/1
0.15 SPRAY, METERED NASAL DAILY
Qty: 1 | Refills: 5 | Status: DISCONTINUED | COMMUNITY
Start: 2018-08-03 | End: 2019-10-25

## 2019-10-25 NOTE — HISTORY OF PRESENT ILLNESS
[de-identified] : This patient comes in for a follow up evaluation. There are several issues to discuss.\par \par Overall, the patient states that she is doing well. She suffers from mild persistent asthma, and has been compliant with her advair, 1 puff b.i.d., medication. She denies coughing, wheezing, mucous, and phlegm production. Additionally, she suffers from sarcoidosis and had a CT scan of her chest. It revealed that most of her lung nodules have not changed. There is a new cluster of stanford-fissural nodules in the left upper lung field.\par \par She has a history of palpitations and is followed by Dr. Meyer in cardiology. She denies chest pain, tightness, and dyspnea on exertion.\par \par The patient is compliant with her statin medication for HLD management.\par \par She is followed by Dr. Meyer in GI, and takes famotidine, 20 mg daily, to manage her GERD. A colonoscopy was performed this past April. An endoscopy was performed yesterday.\par \par She consulted Dr. Yuan in ENT regarding her GERD, and was informed that she has laryngopharyngeal reflux.Her thyroid nodules remain stable. She will undergo a repeat thyroid sonogram under his direction on a yearly basis.\par \par She denies fevers, chills, night sweats, and any other constitutional symptoms. She now comes in for this assessment.

## 2019-10-25 NOTE — END OF VISIT
[FreeTextEntry3] : All medical record entries made by the scribe were at my, Dr. Fadi Monge, direction and personally dictated by me on 10/25/2019. I have reviewed the chart and agree that the record accurately reflects my personal performance of the history, physical exam, assessment and plan. I have also personally directed, reviewed, and agreed with the chart.

## 2019-10-25 NOTE — PLAN
[FreeTextEntry1] : 1. Decrease advair to 1 puff per day and continue other medications as outlined above.\par \par 2. She will receive her flu shot next week and shingrix vaccine in the near future when available.\par \par 3. Repeat CT scan of chest next September/October to re-evaluate perifissural lung nodules.\par \par 4. Follow up with Dr. Yuan in ENT for yearly ENT sonogram.\par \par 5. Follow up with myself in 6 months for a wellness evaluation and routine fasting blood work with ACE and sed rate.

## 2019-10-25 NOTE — DATA REVIEWED
[FreeTextEntry1] : A pulmonary function test is performed. Lung volumes reveal a mild decrease in the total lung capacity, residual volume, and FRC. The vital capacity is normal. Lung mechanics within normal limits. Bronchodilator reactivity is demonstrated. The DLCO is normal. Saturation is maintained. This represents a mild degree of restriction.\par \par CAT scan of the chest performed on 9/17/19 is reviewed. There are multiple scattered stanford-fissural pulmonary nodules between 1-10 mm in size. There is a new cluster of stanford-fissural nodules in the left upper lung zone posterior-laterally.

## 2019-10-25 NOTE — ADDENDUM
[FreeTextEntry1] : I, Seven Owusu, acted solely as a scribe for Dr. Fadi Monge on this date 10/25/2019.

## 2019-12-02 ENCOUNTER — RX RENEWAL (OUTPATIENT)
Age: 59
End: 2019-12-02

## 2020-02-27 ENCOUNTER — APPOINTMENT (OUTPATIENT)
Age: 60
End: 2020-02-27
Payer: COMMERCIAL

## 2020-02-27 VITALS
HEIGHT: 67 IN | RESPIRATION RATE: 16 BRPM | BODY MASS INDEX: 20.56 KG/M2 | HEART RATE: 96 BPM | DIASTOLIC BLOOD PRESSURE: 76 MMHG | TEMPERATURE: 98.9 F | OXYGEN SATURATION: 97 % | SYSTOLIC BLOOD PRESSURE: 118 MMHG | WEIGHT: 131 LBS

## 2020-02-27 PROCEDURE — 99213 OFFICE O/P EST LOW 20 MIN: CPT

## 2020-02-27 NOTE — HISTORY OF PRESENT ILLNESS
[FreeTextEntry8] : Patient is a 59-year-old female history of asthma, sarcoidosis comes in with complaints of cough for the past 2 days. Cough has been dry. She has some mild postnasal drip. Denies any other associated symptoms except for mild sore throat. No sick contacts or recent travel.\par Patient denies any cp, sob,abdominal pain, nausea, vomiting, palpitations, fever, chills, constipation, diarrhea.\par Recently cut back on her Antivert from 2 to 1 puff daily.

## 2020-02-27 NOTE — ASSESSMENT
[FreeTextEntry1] : 1.asthma: Discussed Medrol Dosepak, Went over instructions and side effects of medication.\par Increase fluids, rest. \par Call for new or worsening symptoms.\par

## 2020-04-29 ENCOUNTER — APPOINTMENT (OUTPATIENT)
Dept: INTERNAL MEDICINE | Facility: CLINIC | Age: 60
End: 2020-04-29
Payer: COMMERCIAL

## 2020-04-29 PROCEDURE — 99213 OFFICE O/P EST LOW 20 MIN: CPT | Mod: 95

## 2020-04-29 RX ORDER — METHYLPREDNISOLONE 4 MG/1
4 TABLET ORAL
Qty: 1 | Refills: 0 | Status: DISCONTINUED | COMMUNITY
Start: 2020-02-27 | End: 2020-04-29

## 2020-04-29 NOTE — PHYSICAL EXAM
[Normal] : no acute distress, well nourished, well developed and well-appearing [No Respiratory Distress] : no respiratory distress  [Normal Affect] : the affect was normal [Normal Insight/Judgement] : insight and judgment were intact

## 2020-04-29 NOTE — REASON FOR VISIT
[Home] : at home, [unfilled] , at the time of the visit. [Medical Office: ([facility])___] : at the medical office located in  [Patient] : the patient [Self] : self

## 2020-04-29 NOTE — PLAN
[FreeTextEntry1] : 1. Continued medication as outlined above.\par \par 2. Routine blood work to be performed at this time.\par \par 3. The patient still needs to obtain a new shingles vaccine. She will obtain in the near future.\par \par 4. A repeat CAT scan of the chest will be performed in September. This will be one year from the prior study. This will be to reevaluate the new cluster of tiny nodules seen in the left upper lung field. They are most likely inflammatory in nature, but we will look for progression.\par \par 5. Maintain exercise regimen.\par \par 6. Routine GYN evaluation\par \par 7. Follow up with myself in 6 months with full pulmonary function testing.\par \par 8. Patient will followup with her cardiologist regarding the timing of her next echocardiogram.

## 2020-04-29 NOTE — HISTORY OF PRESENT ILLNESS
[de-identified] : Spoke with the patient today.\par \par This is the patient's yearly wellness assessment. She states, that at this time, she is doing extremely well overall. She remains compliant with her Advair, now only taking one puff daily for treatment of her underlying asthma and airway reactivity. The dosage was cut from one puff twice a day, to one puff daily at the time of the last visit. She has not had any exacerbation of symptoms. She has never required the use of her rescue inhaler.\par \par The patient states that she had been exercising fairly regularly, primarily by walking and using a treadmill. Recently, she has been complaining of discomfort in her knee. She did respond to a cortisone injection and was given approximately one year ago with excellent results.\par \par The patient denies any reflux type symptomatology. She remains compliant with famotidine. She did not go for her yearly blood work. She is due for routine GYN evaluation. She denies any constitutional symptoms at this time, such as fevers, chills, night sweats, or change in bowel habits. Her appetite is good. Her weight remains stable.

## 2020-10-28 DIAGNOSIS — Z20.828 CONTACT WITH AND (SUSPECTED) EXPOSURE TO OTHER VIRAL COMMUNICABLE DISEASES: ICD-10-CM

## 2020-11-02 LAB — SARS-COV-2 N GENE NPH QL NAA+PROBE: NOT DETECTED

## 2020-11-03 ENCOUNTER — APPOINTMENT (OUTPATIENT)
Dept: INTERNAL MEDICINE | Facility: CLINIC | Age: 60
End: 2020-11-03
Payer: COMMERCIAL

## 2020-11-03 VITALS
SYSTOLIC BLOOD PRESSURE: 128 MMHG | OXYGEN SATURATION: 96 % | HEIGHT: 67 IN | BODY MASS INDEX: 20.25 KG/M2 | DIASTOLIC BLOOD PRESSURE: 72 MMHG | WEIGHT: 129 LBS | HEART RATE: 93 BPM | RESPIRATION RATE: 18 BRPM | TEMPERATURE: 98.6 F

## 2020-11-03 DIAGNOSIS — Z23 ENCOUNTER FOR IMMUNIZATION: ICD-10-CM

## 2020-11-03 DIAGNOSIS — K21.9 GASTRO-ESOPHAGEAL REFLUX DISEASE W/OUT ESOPHAGITIS: ICD-10-CM

## 2020-11-03 PROCEDURE — 94729 DIFFUSING CAPACITY: CPT

## 2020-11-03 PROCEDURE — G0008: CPT

## 2020-11-03 PROCEDURE — 94010 BREATHING CAPACITY TEST: CPT

## 2020-11-03 PROCEDURE — 99214 OFFICE O/P EST MOD 30 MIN: CPT | Mod: 25

## 2020-11-03 PROCEDURE — 94727 GAS DIL/WSHOT DETER LNG VOL: CPT

## 2020-11-03 PROCEDURE — ZZZZZ: CPT

## 2020-11-03 PROCEDURE — 99072 ADDL SUPL MATRL&STAF TM PHE: CPT

## 2020-11-03 PROCEDURE — 90686 IIV4 VACC NO PRSV 0.5 ML IM: CPT

## 2020-11-03 NOTE — HISTORY OF PRESENT ILLNESS
[de-identified] : The patient comes in today for a followup evaluation. There are several issues to discuss.\par \par Overall, the patient is doing fairly well. She has been compliant with her medications including the Advair for treatment of her underlying asthma and airway reactivity. She has not had any exacerbations. She does not perform a formal type of exercise. She denies any cough or wheeze.\par \par The patient had been complaining of midepigastric abdominal pain. She spoke to her gastroenterologist, Dr. Meyer. She was told to discontinue the use of Advil, which she had been taking for arthritic type pains, and she was told to take Pepcid on an as needed basis. She had a significant positive response to Pepcid, and now she takes it maybe once a week.\par \par The patient denies any change in bowel habits. There are no fevers or night sweats. Her appetite is good. She still has not undergone a followup CAT scan of the chest to reevaluate the new cluster of nodules in the left upper lung zone. It has not been authorized by her insurance company. She now comes in for this assessment.

## 2020-11-03 NOTE — DATA REVIEWED
[FreeTextEntry1] : A pulmonary function test is performed. Lung volumes reveal a normal total lung capacity and vital capacity. The RV and FRC are mildly reduced. Lung mechanics are within normal limits except for a slight decrease in the FEF 2575. Bronchodilator reactivity is not assessed. The DLCO is at the low limit of normal. The O2 saturation is normal. This represents a mild degree of obstruction. Mild restrictive process may be present as well.\par \par Blood work performed in July is again reviewed with the patient and

## 2020-11-03 NOTE — PLAN
[FreeTextEntry1] : 1. Continued medication as outlined above.\par \par 2. Flu shot has been administered.\par \par 3. The patient will look into the coverage of the new shingles vaccine\par \par 4. Cardiovascular exercise regimen has been recommended\par \par 5. We will again attempt to get authorization to perform a CAT scan of the chest that needs to be done to reevaluate new cluster of nodules in the left upper lung field. The patient does have a history of sarcoid and bronchiectasis. We could consider possible atypical infection versus nodules due to sarcoid.\par \par 6. Followup in 6 months with a wellness evaluation and EKG. She will receive a tetanus booster at that visit as well.

## 2020-11-12 ENCOUNTER — TRANSCRIPTION ENCOUNTER (OUTPATIENT)
Age: 60
End: 2020-11-12

## 2020-12-18 DIAGNOSIS — Z20.828 CONTACT WITH AND (SUSPECTED) EXPOSURE TO OTHER VIRAL COMMUNICABLE DISEASES: ICD-10-CM

## 2020-12-23 LAB — SARS-COV-2 N GENE NPH QL NAA+PROBE: NOT DETECTED

## 2021-01-12 ENCOUNTER — RX RENEWAL (OUTPATIENT)
Age: 61
End: 2021-01-12

## 2021-01-14 ENCOUNTER — NON-APPOINTMENT (OUTPATIENT)
Age: 61
End: 2021-01-14

## 2021-02-04 ENCOUNTER — TRANSCRIPTION ENCOUNTER (OUTPATIENT)
Age: 61
End: 2021-02-04

## 2021-02-06 ENCOUNTER — TRANSCRIPTION ENCOUNTER (OUTPATIENT)
Age: 61
End: 2021-02-06

## 2021-04-02 RX ORDER — FLUTICASONE PROPIONATE AND SALMETEROL 50; 250 UG/1; UG/1
250-50 POWDER RESPIRATORY (INHALATION)
Qty: 3 | Refills: 1 | Status: DISCONTINUED | COMMUNITY
Start: 2017-10-30 | End: 2021-04-02

## 2021-04-20 ENCOUNTER — APPOINTMENT (OUTPATIENT)
Dept: INTERNAL MEDICINE | Facility: CLINIC | Age: 61
End: 2021-04-20
Payer: COMMERCIAL

## 2021-04-20 VITALS
WEIGHT: 128 LBS | OXYGEN SATURATION: 96 % | BODY MASS INDEX: 20.09 KG/M2 | HEIGHT: 67 IN | SYSTOLIC BLOOD PRESSURE: 126 MMHG | TEMPERATURE: 99.4 F | RESPIRATION RATE: 16 BRPM | HEART RATE: 64 BPM | DIASTOLIC BLOOD PRESSURE: 84 MMHG

## 2021-04-20 PROCEDURE — 99072 ADDL SUPL MATRL&STAF TM PHE: CPT

## 2021-04-20 PROCEDURE — 99214 OFFICE O/P EST MOD 30 MIN: CPT

## 2021-04-20 NOTE — PHYSICAL EXAM
[No Acute Distress] : no acute distress [Well Nourished] : well nourished [Well Developed] : well developed [Well-Appearing] : well-appearing [Normal Sclera/Conjunctiva] : normal sclera/conjunctiva [PERRL] : pupils equal round and reactive to light [EOMI] : extraocular movements intact [Normal Outer Ear/Nose] : the outer ears and nose were normal in appearance [Normal Oropharynx] : the oropharynx was normal [Normal TMs] : both tympanic membranes were normal [No JVD] : no jugular venous distention [No Lymphadenopathy] : no lymphadenopathy [Supple] : supple [Thyroid Normal, No Nodules] : the thyroid was normal and there were no nodules present [No Respiratory Distress] : no respiratory distress  [No Accessory Muscle Use] : no accessory muscle use [Clear to Auscultation] : lungs were clear to auscultation bilaterally [Normal Rate] : normal rate  [Regular Rhythm] : with a regular rhythm [Normal S1, S2] : normal S1 and S2 [No Edema] : there was no peripheral edema [No Extremity Clubbing/Cyanosis] : no extremity clubbing/cyanosis [Soft] : abdomen soft [Non Tender] : non-tender [Non-distended] : non-distended [No HSM] : no HSM [Normal Bowel Sounds] : normal bowel sounds [Normal Anterior Cervical Nodes] : no anterior cervical lymphadenopathy [No Rash] : no rash [Coordination Grossly Intact] : coordination grossly intact [No Focal Deficits] : no focal deficits [Normal Affect] : the affect was normal [Normal Insight/Judgement] : insight and judgment were intact

## 2021-04-20 NOTE — ASSESSMENT
[FreeTextEntry1] : #1  Recent headache, noted some fogginess,?  slight dizziness.  Has history of migraine headache, but says that these seem to be different.  She will take Advil or Tylenol as needed.  Recommended to take famotidine daily if taking Advil for more than couple days.  Patient instructed to call or return if symptoms are not improving/resolving.\par \par #2  Moderate persistent asthma.  Pt states that the Symbicort will be prescribed not effective.  Will again prescribe Advair 250 strength. \par \par #3  Sarcoidosis.\par \par #4  Anxiety.

## 2021-04-20 NOTE — HISTORY OF PRESENT ILLNESS
[FreeTextEntry1] : RV [de-identified] : This is a 60-year-old female with a history of sarcoidosis, persistent asthma, who comes in today noting some mild headaches, some foggy sensation, some question of dizziness that she has had for about 2 weeks.  She went to her eye doctor and had a normal eye exam today.  She did see her ENT doctor about 2 weeks ago.  She has been having physical therapy for her neck for about 3 weeks and feels that she has more symptoms on the day following her physical therapy and is stopped the physical therapy for the time being.\par \par Not having any weakness or tingling tingling or numbness in the extremities.  She is not having any confusion.  Does admit to having anxiety.\par \par He received her second Pfizer vaccination on March 31.\par \par Not having any fever or chills.  She denies coughing or shortness of breath.

## 2021-04-22 ENCOUNTER — APPOINTMENT (OUTPATIENT)
Dept: INTERNAL MEDICINE | Facility: CLINIC | Age: 61
End: 2021-04-22
Payer: COMMERCIAL

## 2021-04-22 VITALS
BODY MASS INDEX: 20.09 KG/M2 | RESPIRATION RATE: 18 BRPM | DIASTOLIC BLOOD PRESSURE: 90 MMHG | SYSTOLIC BLOOD PRESSURE: 152 MMHG | HEIGHT: 67 IN | TEMPERATURE: 98.9 F | OXYGEN SATURATION: 97 % | WEIGHT: 128 LBS | HEART RATE: 99 BPM

## 2021-04-22 DIAGNOSIS — R51.9 HEADACHE, UNSPECIFIED: ICD-10-CM

## 2021-04-22 PROCEDURE — 99214 OFFICE O/P EST MOD 30 MIN: CPT

## 2021-04-22 PROCEDURE — 99072 ADDL SUPL MATRL&STAF TM PHE: CPT

## 2021-04-22 NOTE — PLAN
[FreeTextEntry1] : 1. Continued medication as outlined above.\par \par 2. Await results of echocardiogram, scheduled for 4/26/21.\par \par 3. The patient has been referred to Dr. Pulido for a neurological consultation.\par \par 4. Trial of Xanax 0.25 mg q.6 p.r.n.\par \par 5. Follow up with myself next month for a wellness evaluation.

## 2021-04-22 NOTE — PHYSICAL EXAM
[PERRL] : pupils equal round and reactive to light [EOMI] : extraocular movements intact [Normal Rate] : normal rate  [Regular Rhythm] : with a regular rhythm [Normal S1, S2] : normal S1 and S2 [No Murmur] : no murmur heard [No Edema] : there was no peripheral edema [No Extremity Clubbing/Cyanosis] : no extremity clubbing/cyanosis [Soft] : abdomen soft [Normal Bowel Sounds] : normal bowel sounds [Normal Posterior Cervical Nodes] : no posterior cervical lymphadenopathy [Normal Anterior Cervical Nodes] : no anterior cervical lymphadenopathy [No CVA Tenderness] : no CVA  tenderness [Normal] : normal gait, coordination grossly intact, no focal deficits and deep tendon reflexes were 2+ and symmetric [de-identified] : There was normal proprioception as well as light touch to skin.

## 2021-04-22 NOTE — HISTORY OF PRESENT ILLNESS
[FreeTextEntry8] : The patient comes in for an acute evaluation.\par \par The patient has a multitude of complaints at this time. She has been complaining of a vague headache. She also feels as if her vision is not as good as it had been. She feels as if she has not been concentrating as well either. She was seen in this office by a covering physician 2 days ago. She has not had any change in her symptomatology. She has also been evaluated by a cardiologist with a negative workup. She was seen by an ophthalmologist who told her that her examination of her eyes was negative. She was also seen by an ENT physician who couldn't find anything with regards to her sinuses. She was seen by her orthopedic spine surgeon, Dr. Shaw due to the fact that she has had chronic cervical neck pain. He recommended physical therapy, but this appeared to worsen her symptoms.\par \par The patient is complaining of significant amount of anxiety over a variety of things. She has difficulty sleeping at night. She has had issues such as this in the past. She now comes in for this assessment\par

## 2021-04-28 ENCOUNTER — APPOINTMENT (OUTPATIENT)
Dept: NEUROLOGY | Facility: CLINIC | Age: 61
End: 2021-04-28
Payer: COMMERCIAL

## 2021-04-28 VITALS
WEIGHT: 128 LBS | TEMPERATURE: 97.8 F | SYSTOLIC BLOOD PRESSURE: 128 MMHG | BODY MASS INDEX: 20.09 KG/M2 | HEART RATE: 78 BPM | HEIGHT: 67 IN | DIASTOLIC BLOOD PRESSURE: 80 MMHG

## 2021-04-28 DIAGNOSIS — R41.840 ATTENTION AND CONCENTRATION DEFICIT: ICD-10-CM

## 2021-04-28 PROCEDURE — 99072 ADDL SUPL MATRL&STAF TM PHE: CPT

## 2021-04-28 PROCEDURE — 99205 OFFICE O/P NEW HI 60 MIN: CPT

## 2021-04-28 NOTE — PHYSICAL EXAM
[General Appearance - Alert] : alert [General Appearance - In No Acute Distress] : in no acute distress [Oriented To Time, Place, And Person] : oriented to person, place, and time [Impaired Insight] : insight and judgment were intact [Affect] : the affect was normal [Person] : oriented to person [Place] : oriented to place [Time] : oriented to time [Registration Intact] : recent registration memory intact [Concentration Intact] : normal concentrating ability [Naming Objects] : no difficulty naming common objects [Repeating Phrases] : no difficulty repeating a phrase [Fluency] : fluency intact [Comprehension] : comprehension intact [Past History] : adequate knowledge of personal past history [Cranial Nerves Optic (II)] : visual acuity intact bilaterally,  visual fields full to confrontation, pupils equal round and reactive to light [Cranial Nerves Oculomotor (III)] : extraocular motion intact [Cranial Nerves Trigeminal (V)] : facial sensation intact symmetrically [Cranial Nerves Facial (VII)] : face symmetrical [Cranial Nerves Vestibulocochlear (VIII)] : hearing was intact bilaterally [Cranial Nerves Glossopharyngeal (IX)] : tongue and palate midline [Cranial Nerves Accessory (XI - Cranial And Spinal)] : head turning and shoulder shrug symmetric [Cranial Nerves Hypoglossal (XII)] : there was no tongue deviation with protrusion [Motor Tone] : muscle tone was normal in all four extremities [Motor Strength] : muscle strength was normal in all four extremities [No Muscle Atrophy] : normal bulk in all four extremities [Sensation Tactile Decrease] : light touch was intact [Abnormal Walk] : normal gait [Balance] : balance was intact [Past-pointing] : there was no past-pointing [Tremor] : no tremor present [Coordination - Dysmetria Impaired Finger-to-Nose Bilateral] : not present [2+] : Ankle jerk left 2+ [Plantar Reflex Right Only] : normal on the right [Plantar Reflex Left Only] : normal on the left [PERRL With Normal Accommodation] : pupils were equal in size, round, reactive to light, with normal accommodation [Extraocular Movements] : extraocular movements were intact [Full Visual Field] : full visual field [Hearing Threshold Finger Rub Not [place]] : hearing was normal [Both Tympanic Membranes Were Examined] : both tympanic membranes were normal [Neck Cervical Mass (___cm)] : no neck mass was observed [FreeTextEntry1] : ROM Full [Auscultation Breath Sounds / Voice Sounds] : lungs were clear to auscultation bilaterally [Heart Rate And Rhythm] : heart rate was normal and rhythm regular [Heart Sounds] : normal S1 and S2 [Arterial Pulses Carotid] : carotid pulses were normal with no bruits [Edema] : there was no peripheral edema [Involuntary Movements] : no involuntary movements were seen [] : no rash

## 2021-04-28 NOTE — HISTORY OF PRESENT ILLNESS
[FreeTextEntry1] : 60-year-old right-handed female with PMHx of HLD, asthma, sarcoidosis, C2 fracture and migraine during childhood and one episode of migraine with aura 30 years ago (after labor), she presents for evaluation of 2 episodes of transient visual blurring followed by dull headaches - episodes occurred 3 months apart.\par \par Patient reports that in January 2021, she was in a shower, all of a sudden she noted blurriness of vision in both eyes, had difficulty focusing, this lasted a few minutes, was followed by a dull headache that resolved spontaneously. Patient reports second episode in early April - (about 3 weeks ago), she was driving, all of a sudden her vision got blurry, she could not focus, she panicked, pulled over, her vision returned to normal within 40 seconds was followed by a dull headache. Next day patient experienced severe headache, she took Advil, lasted half a day. \par \par Since the recent episode, patient has been very anxious and panicky, she has seen numerous specialists that include cardiologist, ophthalmologists, orthospine surgeon, apparently most of the workup has been negative, she was told by the ophthalmologists that she has floaters in the right eye.\par \par Patient does admit that she is under a lot of stress, her daughter is getting , she is worried if that is something seriously wrong with her. She had had history of C2 fracture, it has healed well but gives her suboccipital pressure and pain on-and-off, she follows up with Dr. Shaw.

## 2021-04-28 NOTE — REVIEW OF SYSTEMS
[Migraine Headache] : migraine headaches [Anxiety] : anxiety [As Noted in HPI] : as noted in HPI [Negative] : Constitutional

## 2021-04-28 NOTE — DISCUSSION/SUMMARY
[FreeTextEntry1] : 60-year-old female with PMHx of HLD, asthma, sarcoidosis, C2 fracture, migraines during childhood, reports one migraine with aura 30 years ago (after childbirth), had sporadic HA's over 2-3 decades, she presents for evaluation of 2 episodes of transient visual blurring followed by dull headaches - 3 months apart.\par \par # Most probably migraines with aura; not intractable, frequency every 3 months\par \par # History of occasional migraines in the past\par \par # History of C2 fracture, sarcoidosis and anxiety\par \par - I reassured the patient regarding her condition, as the frequency is one every 3 months, no prophylactic therapy is indicated, as the headaches are not severe, no abortive therapy is indicated, for now, she may use NSAIDs as needed.\par - Recommended that she maintain a headache diary, note the frequency, pattern and triggers\par - We will obtain MRI of the brain with and without contrast considering history of sarcoidosis.\par - We will also obtain MRA of the brain to rule out remote possibility of VBI .\par - I recommended use Xanax as prescribed by her PMD for situational anxiety

## 2021-04-28 NOTE — REASON FOR VISIT
[Consultation] : a consultation visit [FreeTextEntry1] : Referred by Dr. Monge for evaluation of headaches / visual blurring

## 2021-05-18 ENCOUNTER — APPOINTMENT (OUTPATIENT)
Dept: INTERNAL MEDICINE | Facility: CLINIC | Age: 61
End: 2021-05-18
Payer: COMMERCIAL

## 2021-05-18 VITALS
HEART RATE: 89 BPM | DIASTOLIC BLOOD PRESSURE: 76 MMHG | TEMPERATURE: 99.1 F | OXYGEN SATURATION: 97 % | RESPIRATION RATE: 16 BRPM | BODY MASS INDEX: 20.09 KG/M2 | WEIGHT: 128 LBS | HEIGHT: 67 IN | SYSTOLIC BLOOD PRESSURE: 114 MMHG

## 2021-05-18 PROCEDURE — 99072 ADDL SUPL MATRL&STAF TM PHE: CPT

## 2021-05-18 PROCEDURE — 99396 PREV VISIT EST AGE 40-64: CPT

## 2021-05-18 RX ORDER — ATORVASTATIN CALCIUM 10 MG/1
10 TABLET, FILM COATED ORAL
Qty: 90 | Refills: 1 | Status: DISCONTINUED | COMMUNITY
Start: 2018-02-20 | End: 2021-05-18

## 2021-05-18 NOTE — HEALTH RISK ASSESSMENT
[Yes] : Yes [Monthly or less (1 pt)] : Monthly or less (1 point) [1 or 2 (0 pts)] : 1 or 2 (0 points) [Never (0 pts)] : Never (0 points) [0] : 2) Feeling down, depressed, or hopeless: Not at all (0) [Patient reported mammogram was normal] : Patient reported mammogram was normal [] : No [MammogramDate] : 01/21

## 2021-05-18 NOTE — PLAN
[FreeTextEntry1] : 1. Continue with medication as outlined above.\par \par 2. Await results of the MRI and MRA of the brain which is scheduled for 5/18/21\par \par 3. Followup in 6 months with routine fasting blood work, pulmonary function testing, and EKG.

## 2021-05-18 NOTE — HISTORY OF PRESENT ILLNESS
[FreeTextEntry1] : The patient comes in for a yearly wellness evaluation\par  [de-identified] : Overall, the patient states that she is doing relatively well at this time. She was recently evaluated last month or a multitude of complaints including headaches. It was felt that the majority of her symptoms were due to underlying acute anxiety. She was seen in neurological consultation by Dr. Pulido. She also felt that she had a component of anxiety, however she also thought that she may have a history of migraines. She scheduled the patient for an outpatient MRI and MRA of the brain. Of note is the fact that the patient did take one of the Xanax tablets that I had prescribed for her, with very good results.\par \par The patient states that she has been attempting to exercise. She is having some discomfort in her right knee. She was seen by an orthopedic surgeon who told her to wear a brace.\par \par The patient's appetite is good she denies any other constitutional issues at this time. She now comes in for this assessment.

## 2021-05-19 ENCOUNTER — APPOINTMENT (OUTPATIENT)
Dept: MRI IMAGING | Facility: CLINIC | Age: 61
End: 2021-05-19
Payer: COMMERCIAL

## 2021-05-19 ENCOUNTER — OUTPATIENT (OUTPATIENT)
Dept: OUTPATIENT SERVICES | Facility: HOSPITAL | Age: 61
LOS: 1 days | End: 2021-05-19
Payer: COMMERCIAL

## 2021-05-19 ENCOUNTER — NON-APPOINTMENT (OUTPATIENT)
Age: 61
End: 2021-05-19

## 2021-05-19 DIAGNOSIS — G43.109 MIGRAINE WITH AURA, NOT INTRACTABLE, WITHOUT STATUS MIGRAINOSUS: ICD-10-CM

## 2021-05-19 PROCEDURE — 70544 MR ANGIOGRAPHY HEAD W/O DYE: CPT | Mod: 26,59

## 2021-05-19 PROCEDURE — 70544 MR ANGIOGRAPHY HEAD W/O DYE: CPT

## 2021-05-19 PROCEDURE — 70553 MRI BRAIN STEM W/O & W/DYE: CPT | Mod: 26

## 2021-05-19 PROCEDURE — 70553 MRI BRAIN STEM W/O & W/DYE: CPT

## 2021-05-19 PROCEDURE — A9585: CPT

## 2021-08-13 ENCOUNTER — APPOINTMENT (OUTPATIENT)
Dept: NEUROLOGY | Facility: CLINIC | Age: 61
End: 2021-08-13
Payer: COMMERCIAL

## 2021-08-13 VITALS
DIASTOLIC BLOOD PRESSURE: 70 MMHG | HEART RATE: 72 BPM | BODY MASS INDEX: 19.62 KG/M2 | WEIGHT: 125 LBS | TEMPERATURE: 97.8 F | HEIGHT: 67 IN | SYSTOLIC BLOOD PRESSURE: 110 MMHG

## 2021-08-13 PROCEDURE — 99214 OFFICE O/P EST MOD 30 MIN: CPT

## 2021-08-13 NOTE — REVIEW OF SYSTEMS
[Migraine Headache] : migraine headaches [Anxiety] : anxiety [As Noted in HPI] : as noted in HPI [Negative] : Endocrine

## 2021-08-13 NOTE — HISTORY OF PRESENT ILLNESS
[FreeTextEntry1] : Patient is here for a followup visit today, last seen on 4/28/21. Patient reports she has barely had any headaches in the last 2 months, she reports she is less stressed as her daughter's wedding is over, she reports every once in a while and she gets stressed she feels slight pressure in the head, it resolves, it has not evolved into a full-blown migraine headache\par \par MRI brain done reveals nonspecific white matter changes, no evidence of acute stroke or mass lesion. MRA brain is unremarkable for VBI

## 2021-08-13 NOTE — PHYSICAL EXAM
[General Appearance - Alert] : alert [General Appearance - In No Acute Distress] : in no acute distress [Oriented To Time, Place, And Person] : oriented to person, place, and time [Impaired Insight] : insight and judgment were intact [Affect] : the affect was normal [Person] : oriented to person [Place] : oriented to place [Time] : oriented to time [Registration Intact] : recent registration memory intact [Concentration Intact] : normal concentrating ability [Naming Objects] : no difficulty naming common objects [Repeating Phrases] : no difficulty repeating a phrase [Fluency] : fluency intact [Comprehension] : comprehension intact [Past History] : adequate knowledge of personal past history [Cranial Nerves Optic (II)] : visual acuity intact bilaterally,  visual fields full to confrontation, pupils equal round and reactive to light [Cranial Nerves Oculomotor (III)] : extraocular motion intact [Cranial Nerves Trigeminal (V)] : facial sensation intact symmetrically [Cranial Nerves Vestibulocochlear (VIII)] : hearing was intact bilaterally [Cranial Nerves Facial (VII)] : face symmetrical [Cranial Nerves Glossopharyngeal (IX)] : tongue and palate midline [Cranial Nerves Accessory (XI - Cranial And Spinal)] : head turning and shoulder shrug symmetric [Cranial Nerves Hypoglossal (XII)] : there was no tongue deviation with protrusion [Motor Tone] : muscle tone was normal in all four extremities [Motor Strength] : muscle strength was normal in all four extremities [No Muscle Atrophy] : normal bulk in all four extremities [Sensation Tactile Decrease] : light touch was intact [Abnormal Walk] : normal gait [Balance] : balance was intact [2+] : Ankle jerk left 2+ [PERRL With Normal Accommodation] : pupils were equal in size, round, reactive to light, with normal accommodation [Extraocular Movements] : extraocular movements were intact [Full Visual Field] : full visual field [Hearing Threshold Finger Rub Not [place]] : hearing was normal [Both Tympanic Membranes Were Examined] : both tympanic membranes were normal [] : the neck was supple [Neck Cervical Mass (___cm)] : no neck mass was observed [Past-pointing] : there was no past-pointing [Tremor] : no tremor present [Coordination - Dysmetria Impaired Finger-to-Nose Bilateral] : not present [Plantar Reflex Right Only] : normal on the right [Plantar Reflex Left Only] : normal on the left [FreeTextEntry1] : ROM Full

## 2021-08-13 NOTE — DATA REVIEWED
[No studies available for review at this time.] : No studies available for review at this time. [de-identified] : 5/19/21: MRI brain reveals nonspecific white matter changes, no evidence of acute stroke, mass lesion. MRA brain is unremarkable

## 2021-08-13 NOTE — DISCUSSION/SUMMARY
[FreeTextEntry1] : 60-year-old F with PMHx of HLD, asthma, sarcoidosis, C2 fracture, migraines during childhood, one migraine with aura 30 years ago (after childbirth), had sporadic HA's over 2-3 decades, presented for evaluation of 2 episodes of transient visual blurring followed by dull headaches - 3 months apart.\par \par # Most probably migraines with aura; not intractable, frequency every 3 months, no recurrence since 4 months\par \par # History of occasional migraines in the past\par \par # History of C2 fracture, sarcoidosis and anxiety\par \par - I reassured the patient regarding her condition, as the frequency is one every 3-4 months, no prophylactic therapy is indicated, as the headaches are not severe, no abortive therapy is indicated, for now, she may use NSAIDs as needed.\par - Recommended that she maintain a headache diary, if HA frequency increases f/u with me soon\par - I recommended use Xanax as prescribed by her PMD for situational anxiety

## 2021-11-12 ENCOUNTER — LABORATORY RESULT (OUTPATIENT)
Age: 61
End: 2021-11-12

## 2021-11-15 LAB
25(OH)D3 SERPL-MCNC: 20.9 NG/ML
ALBUMIN SERPL ELPH-MCNC: 4.6 G/DL
ALP BLD-CCNC: 89 U/L
ALT SERPL-CCNC: 22 U/L
ANION GAP SERPL CALC-SCNC: 10 MMOL/L
APPEARANCE: CLEAR
AST SERPL-CCNC: 21 U/L
BACTERIA: NEGATIVE
BASOPHILS # BLD AUTO: 0.07 K/UL
BASOPHILS NFR BLD AUTO: 1.2 %
BILIRUB SERPL-MCNC: 0.6 MG/DL
BILIRUBIN URINE: NEGATIVE
BLOOD URINE: NEGATIVE
BUN SERPL-MCNC: 19 MG/DL
CALCIUM SERPL-MCNC: 10 MG/DL
CHLORIDE SERPL-SCNC: 105 MMOL/L
CHOLEST SERPL-MCNC: 195 MG/DL
CO2 SERPL-SCNC: 25 MMOL/L
COLOR: YELLOW
CREAT SERPL-MCNC: 0.93 MG/DL
EOSINOPHIL # BLD AUTO: 0.22 K/UL
EOSINOPHIL NFR BLD AUTO: 3.9 %
ESTIMATED AVERAGE GLUCOSE: 114 MG/DL
GLUCOSE QUALITATIVE U: NEGATIVE
GLUCOSE SERPL-MCNC: 102 MG/DL
HBA1C MFR BLD HPLC: 5.6 %
HCT VFR BLD CALC: 46.9 %
HDLC SERPL-MCNC: 51 MG/DL
HGB BLD-MCNC: 14.8 G/DL
HYALINE CASTS: 3 /LPF
IMM GRANULOCYTES NFR BLD AUTO: 0.2 %
KETONES URINE: NEGATIVE
LDLC SERPL CALC-MCNC: 112 MG/DL
LEUKOCYTE ESTERASE URINE: ABNORMAL
LYMPHOCYTES # BLD AUTO: 1.34 K/UL
LYMPHOCYTES NFR BLD AUTO: 23.8 %
MAN DIFF?: NORMAL
MCHC RBC-ENTMCNC: 27.2 PG
MCHC RBC-ENTMCNC: 31.6 GM/DL
MCV RBC AUTO: 86.1 FL
MICROSCOPIC-UA: NORMAL
MONOCYTES # BLD AUTO: 0.58 K/UL
MONOCYTES NFR BLD AUTO: 10.3 %
NEUTROPHILS # BLD AUTO: 3.42 K/UL
NEUTROPHILS NFR BLD AUTO: 60.6 %
NITRITE URINE: NEGATIVE
NONHDLC SERPL-MCNC: 143 MG/DL
PH URINE: 6
PLATELET # BLD AUTO: 314 K/UL
POTASSIUM SERPL-SCNC: 5.1 MMOL/L
PROT SERPL-MCNC: 7.2 G/DL
PROTEIN URINE: NORMAL
RBC # BLD: 5.45 M/UL
RBC # FLD: 13.7 %
RED BLOOD CELLS URINE: 1 /HPF
SODIUM SERPL-SCNC: 141 MMOL/L
SPECIFIC GRAVITY URINE: 1.02
SQUAMOUS EPITHELIAL CELLS: 4 /HPF
T3FREE SERPL-MCNC: 3.04 PG/ML
T3RU NFR SERPL: 1 TBI
T4 FREE SERPL-MCNC: 1.3 NG/DL
T4 SERPL-MCNC: 7.1 UG/DL
TRIGL SERPL-MCNC: 156 MG/DL
TSH SERPL-ACNC: 1.95 UIU/ML
UROBILINOGEN URINE: NORMAL
WBC # FLD AUTO: 5.64 K/UL
WHITE BLOOD CELLS URINE: 6 /HPF

## 2021-11-22 DIAGNOSIS — R82.90 UNSPECIFIED ABNORMAL FINDINGS IN URINE: ICD-10-CM

## 2021-12-01 LAB
APPEARANCE: CLEAR
BACTERIA: NEGATIVE
BILIRUBIN URINE: NEGATIVE
BLOOD URINE: NEGATIVE
COLOR: NORMAL
GLUCOSE QUALITATIVE U: NEGATIVE
HYALINE CASTS: 0 /LPF
KETONES URINE: NEGATIVE
LEUKOCYTE ESTERASE URINE: NEGATIVE
MICROSCOPIC-UA: NORMAL
NITRITE URINE: NEGATIVE
PH URINE: 6
PROTEIN URINE: NEGATIVE
RED BLOOD CELLS URINE: 1 /HPF
SARS-COV-2 N GENE NPH QL NAA+PROBE: NOT DETECTED
SPECIFIC GRAVITY URINE: 1.01
SQUAMOUS EPITHELIAL CELLS: 0 /HPF
UROBILINOGEN URINE: NORMAL
WHITE BLOOD CELLS URINE: 1 /HPF

## 2021-12-02 ENCOUNTER — APPOINTMENT (OUTPATIENT)
Dept: INTERNAL MEDICINE | Facility: CLINIC | Age: 61
End: 2021-12-02
Payer: COMMERCIAL

## 2021-12-02 VITALS
HEART RATE: 98 BPM | OXYGEN SATURATION: 97 % | RESPIRATION RATE: 18 BRPM | WEIGHT: 130 LBS | TEMPERATURE: 98.8 F | DIASTOLIC BLOOD PRESSURE: 84 MMHG | HEIGHT: 66 IN | SYSTOLIC BLOOD PRESSURE: 124 MMHG | BODY MASS INDEX: 20.89 KG/M2

## 2021-12-02 PROCEDURE — 94727 GAS DIL/WSHOT DETER LNG VOL: CPT

## 2021-12-02 PROCEDURE — 99214 OFFICE O/P EST MOD 30 MIN: CPT | Mod: 25

## 2021-12-02 PROCEDURE — 94010 BREATHING CAPACITY TEST: CPT

## 2021-12-02 PROCEDURE — 94729 DIFFUSING CAPACITY: CPT

## 2021-12-02 PROCEDURE — ZZZZZ: CPT

## 2021-12-02 NOTE — HISTORY OF PRESENT ILLNESS
[FreeTextEntry1] : The patient comes in today for a routine followup evaluation.\par  [de-identified] : The patient states that she is doing relatively well at this time. She remains compliant with her medications. She is taking her Advair only once a day, not twice a day. She denies any cough, sputum production or wheezing.\par \par The patient was seen by Dr. Pulido, a neurologist due to her headaches. She underwent a workup that included an MRI and MRA of the brain. The studies were unremarkable. It was felt that her issues were being to her by her underlying anxiety. She continues to struggle with this on a daily basis.\par \par The patient has not been eating well. She has not been exercising. Her weight is remaining stable.\par \par The patient was seen by her ENT physician and she recently underwent a thyroid sonogram which was stable. She does have multiple nodules in the thyroid. She now comes in for this assessment.

## 2021-12-02 NOTE — PLAN
[FreeTextEntry1] : 1. Continue his medication as outlined above.\par \par 2. The patient has been told to increase the strength of her Advair to one puff twice a day as part of her maintenance regimen.\par \par 3. The patient is in need of a booster for the corona virus vaccination.\par \par 4. The patient is refusing flu shot at this time\par \par 5. Vitamin E 1000 units daily.\par \par 6. The patient is scheduled to undergo a repeat CAT scan of the chest in January of 2023.\par \par 7. Followup with myself in 6 months with a wellness evaluation. Tetanus will be updated at that visit.

## 2021-12-02 NOTE — DATA REVIEWED
[FreeTextEntry1] : A pulmonary function test is performed. Lung volumes are within normal limits except for a slight decrease in residual volume. Lung mechanics reveal a normal FEV1. The FEF 25-75 is moderately reduced. The DLCO is mildly reduced to 65%, however corrected for alveolar volume is normal. His saturation is normal at 97%. This represents a mild degree of obstruction. Mild restriction cannot be entirely ruled out. His saturation is maintained.\par \par Routine blood work is reviewed\par

## 2021-12-03 ENCOUNTER — RX RENEWAL (OUTPATIENT)
Age: 61
End: 2021-12-03

## 2022-04-25 ENCOUNTER — TRANSCRIPTION ENCOUNTER (OUTPATIENT)
Age: 62
End: 2022-04-25

## 2022-05-11 ENCOUNTER — RX RENEWAL (OUTPATIENT)
Age: 62
End: 2022-05-11

## 2022-06-02 ENCOUNTER — APPOINTMENT (OUTPATIENT)
Dept: INTERNAL MEDICINE | Facility: CLINIC | Age: 62
End: 2022-06-02
Payer: COMMERCIAL

## 2022-06-02 VITALS
SYSTOLIC BLOOD PRESSURE: 122 MMHG | DIASTOLIC BLOOD PRESSURE: 84 MMHG | TEMPERATURE: 98.4 F | HEART RATE: 81 BPM | HEIGHT: 66 IN | RESPIRATION RATE: 16 BRPM | OXYGEN SATURATION: 95 % | WEIGHT: 132 LBS | BODY MASS INDEX: 21.21 KG/M2

## 2022-06-02 PROCEDURE — 90471 IMMUNIZATION ADMIN: CPT

## 2022-06-02 PROCEDURE — 90715 TDAP VACCINE 7 YRS/> IM: CPT

## 2022-06-02 PROCEDURE — 99396 PREV VISIT EST AGE 40-64: CPT | Mod: 25

## 2022-06-02 RX ORDER — FLUTICASONE PROPIONATE AND SALMETEROL 50; 250 UG/1; UG/1
250-50 POWDER RESPIRATORY (INHALATION)
Qty: 1 | Refills: 3 | Status: DISCONTINUED | COMMUNITY
Start: 2021-04-20 | End: 2022-06-02

## 2022-06-02 RX ORDER — BUDESONIDE AND FORMOTEROL FUMARATE DIHYDRATE 160; 4.5 UG/1; UG/1
160-4.5 AEROSOL RESPIRATORY (INHALATION) TWICE DAILY
Qty: 1 | Refills: 2 | Status: DISCONTINUED | COMMUNITY
Start: 2021-04-02 | End: 2022-06-02

## 2022-06-02 NOTE — HISTORY OF PRESENT ILLNESS
[FreeTextEntry1] : The patient comes in for a yearly wellness evaluation\par  [de-identified] : Patient states, that she has been doing fairly well.  She was seen by her cardiologist.  An echocardiogram was performed and it is unchanged.  She denies any chest pains or palpitations.\par \par With regards to her underlying asthma and airway reactivity, she remains compliant with the Advair.  At the time of last visit I wanted her to increase it to a twice daily regimen.  She did this for a while and then cut back to once a day once again.  She does feel better taking it once a day.  She denies any wheeze or sputum production.\par \par The patient continues to have intermittent headaches.  She has not been keeping a careful log as per the recommendations of her neurologist.  She notes that the headaches do occasionally affects her vision.  She was seen by ophthalmology.  She remains active but does not perform any formal type of exercise.  She has been seen by dermatology, as well as gynecology.  She now comes in for this assessment.

## 2022-06-02 NOTE — REVIEW OF SYSTEMS
[Headache] : headache [Anxiety] : anxiety [Negative] : Heme/Lymph [FreeTextEntry6] : see history of present illness

## 2022-06-02 NOTE — PLAN
[FreeTextEntry1] : 1.  Continue with medical regimen as outlined above.  She will now take the Advair 2 puffs once a day.\par \par 2.  Tdap has been administered\par \par 3.  The patient needs to obtain the new shingles vaccine\par \par 4.  Neurology follow-up in September with Dr. Ochoa regarding her headaches\par \par 5.  Cardiovascular exercise.\par \par 6.  Repeat CAT scan of the chest to be performed in January 2023\par \par 7.  Follow-up in 6 months with full pulmonary function testing and routine fasting blood work.  We will review the CAT scan of the chest at that visit.

## 2022-08-22 ENCOUNTER — RX RENEWAL (OUTPATIENT)
Age: 62
End: 2022-08-22

## 2022-08-29 ENCOUNTER — APPOINTMENT (OUTPATIENT)
Dept: INTERNAL MEDICINE | Facility: CLINIC | Age: 62
End: 2022-08-29

## 2022-08-29 VITALS
OXYGEN SATURATION: 97 % | DIASTOLIC BLOOD PRESSURE: 60 MMHG | TEMPERATURE: 98.8 F | SYSTOLIC BLOOD PRESSURE: 100 MMHG | HEIGHT: 66 IN | BODY MASS INDEX: 21.38 KG/M2 | WEIGHT: 133 LBS | HEART RATE: 95 BPM

## 2022-08-29 DIAGNOSIS — M25.512 PAIN IN LEFT SHOULDER: ICD-10-CM

## 2022-08-29 DIAGNOSIS — S12.100A UNSPECIFIED DISPLACED FRACTURE OF SECOND CERVICAL VERTEBRA, INITIAL ENCOUNTER FOR CLOSED FRACTURE: ICD-10-CM

## 2022-08-29 DIAGNOSIS — S76.119A STRAIN OF UNSPECIFIED QUADRICEPS MUSCLE, FASCIA AND TENDON, INITIAL ENCOUNTER: ICD-10-CM
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
## 2022-08-29 DIAGNOSIS — S76.112A STRAIN OF LEFT QUADRICEPS MUSCLE, FASCIA AND TENDON, INITIAL ENCOUNTER: ICD-10-CM

## 2022-08-29 PROCEDURE — 99214 OFFICE O/P EST MOD 30 MIN: CPT

## 2022-08-29 RX ORDER — ALBUTEROL SULFATE 90 UG/1
108 (90 BASE) INHALANT RESPIRATORY (INHALATION)
Qty: 1 | Refills: 0 | Status: DISCONTINUED | COMMUNITY
Start: 2020-04-29 | End: 2022-08-29

## 2022-08-29 RX ORDER — ALPRAZOLAM 0.25 MG/1
0.25 TABLET ORAL
Qty: 20 | Refills: 0 | Status: DISCONTINUED | COMMUNITY
Start: 2021-04-22 | End: 2022-08-29

## 2022-08-29 NOTE — PHYSICAL EXAM
[No Acute Distress] : no acute distress [Well Nourished] : well nourished [Well Developed] : well developed [Normal Sclera/Conjunctiva] : normal sclera/conjunctiva [PERRL] : pupils equal round and reactive to light [Normal Outer Ear/Nose] : the outer ears and nose were normal in appearance [Normal Oropharynx] : the oropharynx was normal [No JVD] : no jugular venous distention [No Lymphadenopathy] : no lymphadenopathy [Supple] : supple [No Respiratory Distress] : no respiratory distress  [No Accessory Muscle Use] : no accessory muscle use [Clear to Auscultation] : lungs were clear to auscultation bilaterally [Normal Rate] : normal rate  [Regular Rhythm] : with a regular rhythm [Normal S1, S2] : normal S1 and S2 [No Edema] : there was no peripheral edema [No Extremity Clubbing/Cyanosis] : no extremity clubbing/cyanosis [Soft] : abdomen soft [Non Tender] : non-tender [Non-distended] : non-distended [No HSM] : no HSM [Normal Bowel Sounds] : normal bowel sounds [Normal Anterior Cervical Nodes] : no anterior cervical lymphadenopathy [No Joint Swelling] : no joint swelling [No Rash] : no rash [Coordination Grossly Intact] : coordination grossly intact [No Focal Deficits] : no focal deficits [Normal Gait] : normal gait [Normal Affect] : the affect was normal [Normal Insight/Judgement] : insight and judgment were intact [de-identified] : NT L knee and L hip areas. FROM L knee and L hip.  FROM neck.  [de-identified] : No visible bruise over L shoulder, L hip areas.

## 2022-08-29 NOTE — HISTORY OF PRESENT ILLNESS
[FreeTextEntry8] : This is a 61-year-old female who accidentally fell in the supermarket left yesterday and seemed to land on her left knee.  She did not hit her head or neck.  She has noted some discomfort in the proximal left thigh area with left hip flexion.  She is also noted some medial left shoulder discomfort with turning or bending the neck.  There is no tingling or numbness or weakness of a upper extremity.  She does walk without a limp.

## 2022-08-29 NOTE — ASSESSMENT
[FreeTextEntry1] : #1  Likely  muscle strain medial L shoulder, proximal anterior L thigh areas.  Consider mild bruise L patella.  Patient is refusing x-rays of left shoulder, left hip, and left knee areas.  She will apply ice as needed for 1 day and then try heat as needed.  She will take Advil as needed.  She knows to go to the emergency room should she have any increase in symptoms or any emergent symptoms.

## 2022-09-06 ENCOUNTER — APPOINTMENT (OUTPATIENT)
Dept: INTERNAL MEDICINE | Facility: CLINIC | Age: 62
End: 2022-09-06

## 2022-09-06 PROCEDURE — 99442: CPT

## 2022-09-06 NOTE — HISTORY OF PRESENT ILLNESS
[Home] : at home, [unfilled] , at the time of the visit. [Medical Office: ([facility])___] : at the medical office located in  [Verbal consent obtained from patient] : the patient, [unfilled] [FreeTextEntry8] : Patient is a 62-year-old female with PMH of anxiety, asthma, atherosclerosis of L carotid, bronchiectasis, GERD, HLD, migraine, Sarcoidosis who presents to office today for telephonic evaluation of the following symptoms:\par \par COVID positive today, symptoms started last night\par Was seen at [facility] \par  positive since Sunday\par Reports mild symptoms including fatigue, low grade fever\par Denies SOB, cough, wheezing\par Medications reviewed, no interactions with Paxlovid, eligible\par Declined paxlovid, hesitant to take \par Wants MAB\par No distress at time of call\par \par

## 2022-09-06 NOTE — PLAN
[FreeTextEntry1] : 1. Medications reviewed with patient, no contraindications to take Paxlovid. She declined Paxlovid and wants MAB. Discussed with patient she does not meet criteria for MAB and discussed managing symptoms with supportive tx ie Tylenol, hydration, rest. The patient would like to speak with Dr. Monge directly. The patient was also advised she may be able to get MAB at [facility] through ER \par

## 2022-09-20 ENCOUNTER — APPOINTMENT (OUTPATIENT)
Dept: INTERNAL MEDICINE | Facility: CLINIC | Age: 62
End: 2022-09-20

## 2022-09-20 VITALS
BODY MASS INDEX: 21.38 KG/M2 | HEIGHT: 66 IN | HEART RATE: 105 BPM | SYSTOLIC BLOOD PRESSURE: 122 MMHG | DIASTOLIC BLOOD PRESSURE: 86 MMHG | OXYGEN SATURATION: 95 % | RESPIRATION RATE: 16 BRPM | WEIGHT: 133 LBS

## 2022-09-20 PROCEDURE — 99213 OFFICE O/P EST LOW 20 MIN: CPT

## 2022-09-20 RX ORDER — FAMOTIDINE 20 MG/1
20 TABLET, FILM COATED ORAL
Qty: 30 | Refills: 0 | Status: DISCONTINUED | COMMUNITY
Start: 2019-10-16 | End: 2022-09-20

## 2022-09-20 NOTE — HISTORY OF PRESENT ILLNESS
[FreeTextEntry1] : F/U [de-identified] : Patient is a 62- year -old female with PMH of anxiety, asthma, atherosclerosis of L carotid, bronchiectasis, GERD, HLD, migraine, Sarcoidosis who presents to office today for follow up visit. The patient recently tested positive for COVID 9/6/22. She did not receive any formal tx for COVID and was treated symptomatically. Patient reports she still has cough and scant yellow mucus.

## 2022-09-20 NOTE — PHYSICAL EXAM
[No Acute Distress] : no acute distress [No Respiratory Distress] : no respiratory distress  [No Accessory Muscle Use] : no accessory muscle use [Clear to Auscultation] : lungs were clear to auscultation bilaterally [Normal Rate] : normal rate  [Regular Rhythm] : with a regular rhythm [Normal S1, S2] : normal S1 and S2 [No Edema] : there was no peripheral edema [Soft] : abdomen soft [Non Tender] : non-tender [Non-distended] : non-distended [Normal Bowel Sounds] : normal bowel sounds [Normal Gait] : normal gait [Normal Affect] : the affect was normal [Alert and Oriented x3] : oriented to person, place, and time

## 2022-09-20 NOTE — PLAN
[FreeTextEntry1] : 1. The patient will trial zpack x 5 days. She will resume famotidine 20mg daily. She will trial silver nasal spray to dry secretions for possible PND. She will notify my office if there is no improvement in her symptoms above by the end of the week

## 2022-10-04 RX ORDER — AZITHROMYCIN 250 MG/1
250 TABLET, FILM COATED ORAL
Qty: 1 | Refills: 0 | Status: DISCONTINUED | COMMUNITY
Start: 2022-09-20 | End: 2022-10-04

## 2022-10-05 ENCOUNTER — APPOINTMENT (OUTPATIENT)
Dept: INTERNAL MEDICINE | Facility: CLINIC | Age: 62
End: 2022-10-05

## 2022-10-05 VITALS
HEART RATE: 102 BPM | OXYGEN SATURATION: 99 % | SYSTOLIC BLOOD PRESSURE: 112 MMHG | TEMPERATURE: 98.5 F | WEIGHT: 133 LBS | DIASTOLIC BLOOD PRESSURE: 72 MMHG | RESPIRATION RATE: 16 BRPM | BODY MASS INDEX: 21.38 KG/M2 | HEIGHT: 66 IN

## 2022-10-05 DIAGNOSIS — J30.9 ALLERGIC RHINITIS, UNSPECIFIED: ICD-10-CM

## 2022-10-05 DIAGNOSIS — U07.1 COVID-19: ICD-10-CM

## 2022-10-05 DIAGNOSIS — R05.9 COUGH, UNSPECIFIED: ICD-10-CM

## 2022-10-05 PROCEDURE — 99214 OFFICE O/P EST MOD 30 MIN: CPT | Mod: 25

## 2022-10-05 RX ORDER — MAGNESIUM OXIDE/MAG AA CHELATE 300 MG
CAPSULE ORAL
Refills: 0 | Status: DISCONTINUED | COMMUNITY
End: 2022-10-05

## 2022-10-05 NOTE — PLAN
[FreeTextEntry1] : 1.  Continue with routine medication as outlined above.\par \par 2.  Prednisone 20 mg twice daily for 6 days as burst therapy to see if this helps to eradicate the residual cough which may be secondary to airway inflammation from the recent COVID infection.  Of note is the fact that she does have a history of asthma.\par \par 3.  Nasonex 2 squirts in each nostril once or twice a day to help with potential postnasal drip\par \par 4.  Follow-up with myself early next year with full pulmonary function testing and routine fasting blood work.  We will review the CAT scan of the chest which is scheduled to be performed in January 2023 at that time.

## 2022-10-05 NOTE — HISTORY OF PRESENT ILLNESS
[FreeTextEntry8] : The patient comes in today for an acute evaluation.\par \par The patient was diagnosed with COVID approximately 1 month ago.  She was not given any treatment such as Paxil COVID, or monoclonal antibodies.  She treated herself symptomatically as needed, with Tylenol and Advil.  Patient quarantined initially, and then gradually returned back to work.\par \par The patient was seen in this office the following week.  She was noted to have a persistent cough.  She was treated with a Zithromax Z-TATE.  She also had a slight increase in GI symptomatology and for this, she was treated with Pepcid.  Her cough was nonproductive.\par \par The cough has lingered.  She notes that the cough appears to be worse in the morning upon awakening.  Initially the cough was productive and the Z-Tate did help.  The cough is now nonproductive.  She continues to take her Advair once a day.  The cough appears to improve later in the day after she is up and ambulating.  She denies any fevers or rigors.\par \par The patient thinks that she may have some mild allergy symptoms as well.  There is slight nasal congestion.  She has used Flonase in the past.  There are no purulent nasal secretions.  She now comes in for this assessment.\par \par

## 2022-10-05 NOTE — PHYSICAL EXAM
[Normal] : no acute distress, well nourished, well developed and well-appearing [No JVD] : no jugular venous distention [Supple] : supple [No Respiratory Distress] : no respiratory distress  [No Accessory Muscle Use] : no accessory muscle use [Clear to Auscultation] : lungs were clear to auscultation bilaterally [Regular Rhythm] : with a regular rhythm [Normal S1, S2] : normal S1 and S2 [No Edema] : there was no peripheral edema [No Extremity Clubbing/Cyanosis] : no extremity clubbing/cyanosis [Normal Posterior Cervical Nodes] : no posterior cervical lymphadenopathy [Normal Anterior Cervical Nodes] : no anterior cervical lymphadenopathy [No Rash] : no rash [de-identified] : The nasal mucosa is mildly erythematous.  There is no exudate noted.

## 2022-10-14 ENCOUNTER — APPOINTMENT (OUTPATIENT)
Dept: NEUROLOGY | Facility: CLINIC | Age: 62
End: 2022-10-14

## 2022-10-14 ENCOUNTER — NON-APPOINTMENT (OUTPATIENT)
Age: 62
End: 2022-10-14

## 2022-10-14 VITALS
DIASTOLIC BLOOD PRESSURE: 76 MMHG | WEIGHT: 130 LBS | SYSTOLIC BLOOD PRESSURE: 127 MMHG | BODY MASS INDEX: 21.66 KG/M2 | TEMPERATURE: 97.8 F | HEART RATE: 98 BPM | HEIGHT: 65 IN

## 2022-10-14 DIAGNOSIS — G43.109 MIGRAINE WITH AURA, NOT INTRACTABLE, W/OUT STATUS MIGRAINOSUS: ICD-10-CM

## 2022-10-14 PROCEDURE — 99214 OFFICE O/P EST MOD 30 MIN: CPT

## 2022-10-14 RX ORDER — PREDNISONE 20 MG/1
20 TABLET ORAL
Qty: 12 | Refills: 0 | Status: DISCONTINUED | COMMUNITY
Start: 2022-10-05 | End: 2022-10-14

## 2022-10-14 NOTE — HISTORY OF PRESENT ILLNESS
[FreeTextEntry1] : Patient is here for a followup visit today, last seen on 8/13/21.  Patient reports in the past 1 year, she has had 5 migraines preceded by aura, the aura lasts for about 35 seconds then is followed by a dull headache, she takes 4 Advil's or relieves to obtain relief, she has not noted any significant triggers, but does admit that she has been under a lot of stress related to daughter's wedding /baby shower, she also admits that anxiety adds to her stress.  Reports that the last migraine was milder, however lingered on for the whole day.\par \par Patient also brings to my attention that she had COVID a month ago, she has made full recovery.

## 2022-10-14 NOTE — PHYSICAL EXAM
[General Appearance - Alert] : alert [General Appearance - In No Acute Distress] : in no acute distress [Oriented To Time, Place, And Person] : oriented to person, place, and time [Affect] : the affect was normal [Impaired Insight] : insight and judgment were intact [Person] : oriented to person [Place] : oriented to place [Time] : oriented to time [Registration Intact] : recent registration memory intact [Concentration Intact] : normal concentrating ability [Naming Objects] : no difficulty naming common objects [Repeating Phrases] : no difficulty repeating a phrase [Fluency] : fluency intact [Comprehension] : comprehension intact [Past History] : adequate knowledge of personal past history [Cranial Nerves Optic (II)] : visual acuity intact bilaterally,  visual fields full to confrontation, pupils equal round and reactive to light [Cranial Nerves Oculomotor (III)] : extraocular motion intact [Cranial Nerves Trigeminal (V)] : facial sensation intact symmetrically [Cranial Nerves Facial (VII)] : face symmetrical [Cranial Nerves Vestibulocochlear (VIII)] : hearing was intact bilaterally [Cranial Nerves Glossopharyngeal (IX)] : tongue and palate midline [Cranial Nerves Accessory (XI - Cranial And Spinal)] : head turning and shoulder shrug symmetric [Cranial Nerves Hypoglossal (XII)] : there was no tongue deviation with protrusion [Motor Tone] : muscle tone was normal in all four extremities [Motor Strength] : muscle strength was normal in all four extremities [No Muscle Atrophy] : normal bulk in all four extremities [Sensation Tactile Decrease] : light touch was intact [Abnormal Walk] : normal gait [Balance] : balance was intact [2+] : Ankle jerk left 2+ [PERRL With Normal Accommodation] : pupils were equal in size, round, reactive to light, with normal accommodation [Extraocular Movements] : extraocular movements were intact [Full Visual Field] : full visual field [Hearing Threshold Finger Rub Not [place]] : hearing was normal [Both Tympanic Membranes Were Examined] : both tympanic membranes were normal [] : the neck was supple [Neck Cervical Mass (___cm)] : no neck mass was observed [Past-pointing] : there was no past-pointing [Tremor] : no tremor present [Coordination - Dysmetria Impaired Finger-to-Nose Bilateral] : not present [Plantar Reflex Right Only] : normal on the right [Plantar Reflex Left Only] : normal on the left [FreeTextEntry1] : ROM Full

## 2022-10-14 NOTE — DISCUSSION/SUMMARY
[FreeTextEntry1] : 62-year-old F with PMHx of HLD, asthma, sarcoidosis, C2 fracture, migraines during childhood, one migraine with aura 30 years ago (after childbirth), had sporadic HA's over 2-3 decades, presented for evaluation of 2 episodes of transient visual blurring followed by dull headaches - 3 months apart.\par \par # Migraines with aura; not intractable, frequency every 2-3 months, 5 in last year\par \par # History of occasional migraines in the past\par \par # History of C2 fracture, sarcoidosis and anxiety\par \par -Patient reports she does not want to start any abortive or prophylactic therapy, she hates taking medications, she is however willing to try Migrelief 1 capsule daily, I also recommended diclofenac potassium for abortive therapy\par -Migrelief 1 capsule daily\par - Cambia 50 Mg p.o. as needed migraine headache with GI prophylaxis\par - Recommended that she maintain a headache diary\par - I recommended use Xanax as prescribed by her PMD for situational anxiety

## 2022-10-14 NOTE — DATA REVIEWED
[No studies available for review at this time.] : No studies available for review at this time. [de-identified] : 5/19/21: MRI brain reveals nonspecific white matter changes, no evidence of acute stroke, mass lesion. MRA brain is unremarkable

## 2022-11-21 LAB
25(OH)D3 SERPL-MCNC: 21.7 NG/ML
ALBUMIN SERPL ELPH-MCNC: 4.4 G/DL
ALP BLD-CCNC: 92 U/L
ALT SERPL-CCNC: 18 U/L
ANION GAP SERPL CALC-SCNC: 14 MMOL/L
APPEARANCE: CLEAR
AST SERPL-CCNC: 20 U/L
BACTERIA: NEGATIVE
BASOPHILS # BLD AUTO: 0.07 K/UL
BASOPHILS NFR BLD AUTO: 1.1 %
BILIRUB SERPL-MCNC: 0.6 MG/DL
BILIRUBIN URINE: NEGATIVE
BLOOD URINE: NEGATIVE
BUN SERPL-MCNC: 18 MG/DL
CALCIUM OXALATE CRYSTALS: ABNORMAL
CALCIUM SERPL-MCNC: 9.7 MG/DL
CHLORIDE SERPL-SCNC: 102 MMOL/L
CHOLEST SERPL-MCNC: 179 MG/DL
CO2 SERPL-SCNC: 23 MMOL/L
COLOR: YELLOW
CREAT SERPL-MCNC: 0.92 MG/DL
EGFR: 70 ML/MIN/1.73M2
EOSINOPHIL # BLD AUTO: 0.17 K/UL
EOSINOPHIL NFR BLD AUTO: 2.8 %
ERYTHROCYTE [SEDIMENTATION RATE] IN BLOOD BY WESTERGREN METHOD: 7 MM/HR
ESTIMATED AVERAGE GLUCOSE: 120 MG/DL
GLUCOSE QUALITATIVE U: NEGATIVE
GLUCOSE SERPL-MCNC: 91 MG/DL
HBA1C MFR BLD HPLC: 5.8 %
HCT VFR BLD CALC: 41.8 %
HDLC SERPL-MCNC: 55 MG/DL
HGB BLD-MCNC: 13.7 G/DL
HYALINE CASTS: 0 /LPF
IMM GRANULOCYTES NFR BLD AUTO: 0.3 %
KETONES URINE: NEGATIVE
LDLC SERPL CALC-MCNC: 95 MG/DL
LEUKOCYTE ESTERASE URINE: NEGATIVE
LYMPHOCYTES # BLD AUTO: 1.45 K/UL
LYMPHOCYTES NFR BLD AUTO: 23.5 %
MAN DIFF?: NORMAL
MCHC RBC-ENTMCNC: 27 PG
MCHC RBC-ENTMCNC: 32.8 GM/DL
MCV RBC AUTO: 82.4 FL
MICROSCOPIC-UA: NORMAL
MONOCYTES # BLD AUTO: 0.61 K/UL
MONOCYTES NFR BLD AUTO: 9.9 %
NEUTROPHILS # BLD AUTO: 3.86 K/UL
NEUTROPHILS NFR BLD AUTO: 62.4 %
NITRITE URINE: NEGATIVE
NONHDLC SERPL-MCNC: 123 MG/DL
PH URINE: 6
PLATELET # BLD AUTO: 318 K/UL
POTASSIUM SERPL-SCNC: 4.2 MMOL/L
PROT SERPL-MCNC: 7.2 G/DL
PROTEIN URINE: NORMAL
RBC # BLD: 5.07 M/UL
RBC # FLD: 14.2 %
RED BLOOD CELLS URINE: 0 /HPF
SODIUM SERPL-SCNC: 139 MMOL/L
SPECIFIC GRAVITY URINE: 1.03
SQUAMOUS EPITHELIAL CELLS: 2 /HPF
T3FREE SERPL-MCNC: 3.08 PG/ML
T4 FREE SERPL-MCNC: 1.3 NG/DL
TRIGL SERPL-MCNC: 142 MG/DL
TSH SERPL-ACNC: 2.12 UIU/ML
UROBILINOGEN URINE: NORMAL
WBC # FLD AUTO: 6.18 K/UL
WHITE BLOOD CELLS URINE: 1 /HPF

## 2022-11-22 ENCOUNTER — NON-APPOINTMENT (OUTPATIENT)
Age: 62
End: 2022-11-22

## 2022-11-22 LAB — ACE BLD-CCNC: 51 U/L

## 2022-12-24 ENCOUNTER — RX RENEWAL (OUTPATIENT)
Age: 62
End: 2022-12-24

## 2023-01-20 ENCOUNTER — RX RENEWAL (OUTPATIENT)
Age: 63
End: 2023-01-20

## 2023-02-20 ENCOUNTER — APPOINTMENT (OUTPATIENT)
Dept: INTERNAL MEDICINE | Facility: CLINIC | Age: 63
End: 2023-02-20
Payer: COMMERCIAL

## 2023-02-20 VITALS
WEIGHT: 133 LBS | OXYGEN SATURATION: 97 % | TEMPERATURE: 98.9 F | BODY MASS INDEX: 21.38 KG/M2 | SYSTOLIC BLOOD PRESSURE: 106 MMHG | HEIGHT: 66 IN | HEART RATE: 97 BPM | DIASTOLIC BLOOD PRESSURE: 71 MMHG | RESPIRATION RATE: 16 BRPM

## 2023-02-20 DIAGNOSIS — R91.8 OTHER NONSPECIFIC ABNORMAL FINDING OF LUNG FIELD: ICD-10-CM

## 2023-02-20 DIAGNOSIS — K76.89 OTHER SPECIFIED DISEASES OF LIVER: ICD-10-CM

## 2023-02-20 PROCEDURE — 94727 GAS DIL/WSHOT DETER LNG VOL: CPT

## 2023-02-20 PROCEDURE — 99214 OFFICE O/P EST MOD 30 MIN: CPT | Mod: 25

## 2023-02-20 PROCEDURE — 94729 DIFFUSING CAPACITY: CPT

## 2023-02-20 PROCEDURE — ZZZZZ: CPT

## 2023-02-20 PROCEDURE — 94060 EVALUATION OF WHEEZING: CPT

## 2023-02-20 RX ORDER — DICLOFENAC SODIUM 50 MG/1
50 TABLET, DELAYED RELEASE ORAL
Qty: 15 | Refills: 1 | Status: DISCONTINUED | COMMUNITY
Start: 2022-10-24 | End: 2023-02-20

## 2023-02-20 RX ORDER — B2/MAGNESIUM CIT,OXID/FEVERFEW 200-180-50
200-180-50 TABLET ORAL
Qty: 90 | Refills: 0 | Status: DISCONTINUED | COMMUNITY
Start: 2022-10-14 | End: 2023-02-20

## 2023-02-20 RX ORDER — DICLOFENAC POTASSIUM 50 MG/1
50 POWDER, FOR SOLUTION ORAL
Qty: 12 | Refills: 1 | Status: DISCONTINUED | COMMUNITY
Start: 2022-10-14 | End: 2023-02-20

## 2023-02-20 NOTE — HISTORY OF PRESENT ILLNESS
[FreeTextEntry1] : The patient comes in today for a routine followup evaluation.\par  [de-identified] : The patient states, at this time, she is doing relatively well.  She remains compliant with her Advair, taking 1 puff daily.  She has not had any exacerbations of her underlying asthma or airway reactivity.\par \par The patient recently underwent her yearly cardiac assessment.  She had an echocardiogram performed 2 weeks ago.  An EKG was also performed.  She denies any chest pains or palpitations.  She recently began an exercise regimen.\par \par Patient did have an episode of COVID back in the fall.  She had significant symptoms including cough.  She is now back to her baseline.  She denies any acute constitutional symptoms at present.  She recently saw a gastroenterologist due to the fact that she was noted to have a cyst in the liver which was picked up on the cardiac echo.  She subsequently had a sonogram of her abdomen which was unremarkable.  Blood work apparently was unremarkable as well.  She now comes in for this assessment.

## 2023-02-20 NOTE — PHYSICAL EXAM
[General Appearance - Alert] : alert [General Appearance - In No Acute Distress] : in no acute distress [Outer Ear] : the ears and nose were normal in appearance [Oropharynx] : the oropharynx was normal [Neck Appearance] : the appearance of the neck was normal [Neck Cervical Mass (___cm)] : no neck mass was observed [Jugular Venous Distention Increased] : there was no jugular-venous distention [Thyroid Diffuse Enlargement] : the thyroid was not enlarged [Thyroid Nodule] : there were no palpable thyroid nodules [Auscultation Breath Sounds / Voice Sounds] : lungs were clear to auscultation bilaterally [Heart Rate And Rhythm] : heart rate was normal and rhythm regular [Heart Sounds] : normal S1 and S2 [Heart Sounds Gallop] : no gallops [Murmurs] : no murmurs [Heart Sounds Pericardial Friction Rub] : no pericardial rub [Arterial Pulses Carotid] : carotid pulses were normal with no bruits [Edema] : there was no peripheral edema [Abdomen Soft] : soft [Bowel Sounds] : normal bowel sounds [Abdomen Tenderness] : non-tender [] : no hepato-splenomegaly [Abdomen Mass (___ Cm)] : no abdominal mass palpated [Cervical Lymph Nodes Enlarged Posterior Bilaterally] : posterior cervical [Cervical Lymph Nodes Enlarged Anterior Bilaterally] : anterior cervical [Supraclavicular Lymph Nodes Enlarged Bilaterally] : supraclavicular [Nail Clubbing] : no clubbing  or cyanosis of the fingernails

## 2023-02-20 NOTE — DATA REVIEWED
[FreeTextEntry1] : A pulmonary function test is performed.  Lung volumes reveal a normal total lung capacity and vital capacity.  The RV and FRC are mildly reduced.  Lung mechanics reveal a mild decrease in flow rates.  Bronchodilator reactivity is not demonstrated.  The DLCO is mildly reduced, however corrects for alveolar volume it is normal.  The saturation is 97%.  This represents a mild degree of restriction.  Mild obstruction appears to be present as well without bronchodilator reactivity.  The DLCO uncorrected is normal.  When compared to the prior study, the flow rates have improved slightly.

## 2023-03-31 ENCOUNTER — NON-APPOINTMENT (OUTPATIENT)
Age: 63
End: 2023-03-31

## 2023-04-10 ENCOUNTER — APPOINTMENT (OUTPATIENT)
Dept: INTERNAL MEDICINE | Facility: CLINIC | Age: 63
End: 2023-04-10
Payer: COMMERCIAL

## 2023-04-10 VITALS
HEART RATE: 100 BPM | WEIGHT: 138 LBS | TEMPERATURE: 97.6 F | HEIGHT: 66 IN | OXYGEN SATURATION: 97 % | DIASTOLIC BLOOD PRESSURE: 70 MMHG | RESPIRATION RATE: 16 BRPM | SYSTOLIC BLOOD PRESSURE: 110 MMHG | BODY MASS INDEX: 22.18 KG/M2

## 2023-04-10 DIAGNOSIS — E04.1 NONTOXIC SINGLE THYROID NODULE: ICD-10-CM

## 2023-04-10 DIAGNOSIS — E55.9 VITAMIN D DEFICIENCY, UNSPECIFIED: ICD-10-CM

## 2023-04-10 PROCEDURE — 99213 OFFICE O/P EST LOW 20 MIN: CPT

## 2023-04-10 NOTE — HISTORY OF PRESENT ILLNESS
[FreeTextEntry1] : F/U [de-identified] : Patient is a 62- year -old female with PMH of anxiety, asthma, atherosclerosis of L carotid, bronchiectasis, GERD, HLD, migraine, Sarcoidosis who presents to office today for follow up visit. Patient states she has been feeling off past couple of weeks - HA, dizziness,blurred vision. Feels she felt much better on 100-50mg adviar dose qd and would like to go back to that dose. Was seen by cardiology, neurology, optho with negative work up. No acute complaints today

## 2023-04-10 NOTE — PLAN
[FreeTextEntry1] : 1. Decrease fluticasone to 100-50mg qd\par \par 2. Start mometasone qd for allergies- states she cannot take PO medication \par \par 3. F/u 1 month - if no improvement consider flovent or other alt inhaler

## 2023-05-08 ENCOUNTER — APPOINTMENT (OUTPATIENT)
Dept: INTERNAL MEDICINE | Facility: CLINIC | Age: 63
End: 2023-05-08
Payer: COMMERCIAL

## 2023-05-08 ENCOUNTER — NON-APPOINTMENT (OUTPATIENT)
Age: 63
End: 2023-05-08

## 2023-05-08 VITALS
HEART RATE: 97 BPM | TEMPERATURE: 97.6 F | BODY MASS INDEX: 21.66 KG/M2 | DIASTOLIC BLOOD PRESSURE: 70 MMHG | WEIGHT: 134.8 LBS | OXYGEN SATURATION: 97 % | SYSTOLIC BLOOD PRESSURE: 108 MMHG | HEIGHT: 66 IN

## 2023-05-08 DIAGNOSIS — H53.8 OTHER VISUAL DISTURBANCES: ICD-10-CM

## 2023-05-08 DIAGNOSIS — F41.9 ANXIETY DISORDER, UNSPECIFIED: ICD-10-CM

## 2023-05-08 PROBLEM — E55.9 VITAMIN D INSUFFICIENCY: Status: ACTIVE | Noted: 2017-07-10

## 2023-05-08 PROBLEM — E04.1 THYROID NODULE: Status: ACTIVE | Noted: 2018-09-06

## 2023-05-08 PROCEDURE — 94010 BREATHING CAPACITY TEST: CPT

## 2023-05-08 PROCEDURE — 99213 OFFICE O/P EST LOW 20 MIN: CPT | Mod: 25

## 2023-05-08 RX ORDER — ALPRAZOLAM 0.5 MG/1
0.5 TABLET ORAL
Qty: 30 | Refills: 0 | Status: ACTIVE | COMMUNITY
Start: 2023-05-08 | End: 1900-01-01

## 2023-05-08 RX ORDER — FLUTICASONE PROPIONATE AND SALMETEROL 50; 250 UG/1; UG/1
250-50 POWDER RESPIRATORY (INHALATION)
Qty: 3 | Refills: 3 | Status: DISCONTINUED | COMMUNITY
Start: 2022-05-11 | End: 2023-05-08

## 2023-05-08 NOTE — PLAN
[FreeTextEntry1] : 1. The patient will trial xanax as needed for her intermittent symptoms- she feels it may be related to anxiety. She does not wish to start anti depressant at this time, will think about therapy  \par \par 2. Rx for full fasting labs given to patient, will follow results\par \par 3. Continue with lower dose advair as spirometry is normal today\par \par 4. F/U 9/2023 or sooner if needed \par \par 5. Mometasone qd for allergy symptoms

## 2023-05-08 NOTE — HISTORY OF PRESENT ILLNESS
[FreeTextEntry1] : F/U  [de-identified] : Patient is a 62- year -old female with PMH of anxiety, asthma, atherosclerosis of L carotid, bronchiectasis, GERD, HLD, migraine, Sarcoidosis who presents to office today for follow up visit. Patient  last visit states she had been feeling off- HA, dizziness,blurred vision. Wanted to try 100-50mg Advair dose qd and see if symptoms improved. Patient states symptoms have not improved on lower dose advair. Feels it may be related to anxiety but does not want to start anti-depressant. Has been seen by cardiology, neurology, ENT for symptoms above with negative work ups.\par

## 2023-05-08 NOTE — PHYSICAL EXAM
[de-identified] : Erythematous nares b/l [No Acute Distress] : no acute distress [No Respiratory Distress] : no respiratory distress  [No Accessory Muscle Use] : no accessory muscle use [Clear to Auscultation] : lungs were clear to auscultation bilaterally [Normal Rate] : normal rate  [Regular Rhythm] : with a regular rhythm [Normal S1, S2] : normal S1 and S2 [No Edema] : there was no peripheral edema [Soft] : abdomen soft [Non Tender] : non-tender [Non-distended] : non-distended [Normal Bowel Sounds] : normal bowel sounds [Normal Gait] : normal gait [Normal Affect] : the affect was normal [Alert and Oriented x3] : oriented to person, place, and time

## 2023-06-19 ENCOUNTER — RX RENEWAL (OUTPATIENT)
Age: 63
End: 2023-06-19

## 2023-08-31 ENCOUNTER — NON-APPOINTMENT (OUTPATIENT)
Age: 63
End: 2023-08-31

## 2023-08-31 LAB
25(OH)D3 SERPL-MCNC: 24.7 NG/ML
ALBUMIN SERPL ELPH-MCNC: 4.6 G/DL
ALP BLD-CCNC: 96 U/L
ALT SERPL-CCNC: 19 U/L
ANION GAP SERPL CALC-SCNC: 13 MMOL/L
APPEARANCE: CLEAR
AST SERPL-CCNC: 22 U/L
BILIRUB SERPL-MCNC: 0.7 MG/DL
BILIRUBIN URINE: NEGATIVE
BLOOD URINE: NEGATIVE
BUN SERPL-MCNC: 17 MG/DL
CALCIUM SERPL-MCNC: 10.1 MG/DL
CHLORIDE SERPL-SCNC: 104 MMOL/L
CHOLEST SERPL-MCNC: 195 MG/DL
CO2 SERPL-SCNC: 24 MMOL/L
COLOR: YELLOW
CREAT SERPL-MCNC: 0.98 MG/DL
EGFR: 65 ML/MIN/1.73M2
ESTIMATED AVERAGE GLUCOSE: 120 MG/DL
FOLATE SERPL-MCNC: 19.4 NG/ML
GLUCOSE QUALITATIVE U: NEGATIVE MG/DL
GLUCOSE SERPL-MCNC: 101 MG/DL
HBA1C MFR BLD HPLC: 5.8 %
HDLC SERPL-MCNC: 49 MG/DL
IRON SATN MFR SERPL: 26 %
IRON SERPL-MCNC: 83 UG/DL
KETONES URINE: NEGATIVE MG/DL
LDLC SERPL CALC-MCNC: 111 MG/DL
LEUKOCYTE ESTERASE URINE: NEGATIVE
NITRITE URINE: NEGATIVE
NONHDLC SERPL-MCNC: 146 MG/DL
PH URINE: 5.5
POTASSIUM SERPL-SCNC: 4.8 MMOL/L
PROT SERPL-MCNC: 7.5 G/DL
PROTEIN URINE: NEGATIVE MG/DL
SODIUM SERPL-SCNC: 141 MMOL/L
SPECIFIC GRAVITY URINE: 1.02
T3FREE SERPL-MCNC: 2.9 PG/ML
T4 FREE SERPL-MCNC: 1.2 NG/DL
TIBC SERPL-MCNC: 318 UG/DL
TRIGL SERPL-MCNC: 202 MG/DL
TSH SERPL-ACNC: 2.14 UIU/ML
UIBC SERPL-MCNC: 235 UG/DL
UROBILINOGEN URINE: 0.2 MG/DL
VIT B12 SERPL-MCNC: 523 PG/ML

## 2023-09-14 ENCOUNTER — APPOINTMENT (OUTPATIENT)
Dept: INTERNAL MEDICINE | Facility: CLINIC | Age: 63
End: 2023-09-14
Payer: COMMERCIAL

## 2023-09-14 VITALS
BODY MASS INDEX: 21.69 KG/M2 | SYSTOLIC BLOOD PRESSURE: 118 MMHG | HEART RATE: 82 BPM | OXYGEN SATURATION: 94 % | WEIGHT: 135 LBS | HEIGHT: 66 IN | DIASTOLIC BLOOD PRESSURE: 84 MMHG | TEMPERATURE: 98.4 F

## 2023-09-14 DIAGNOSIS — J98.4 OTHER DISORDERS OF LUNG: ICD-10-CM

## 2023-09-14 DIAGNOSIS — Z00.00 ENCOUNTER FOR GENERAL ADULT MEDICAL EXAMINATION W/OUT ABNORMAL FINDINGS: ICD-10-CM

## 2023-09-14 PROCEDURE — 99396 PREV VISIT EST AGE 40-64: CPT

## 2023-09-14 RX ORDER — FAMOTIDINE 20 MG/1
20 TABLET, FILM COATED ORAL DAILY
Qty: 90 | Refills: 3 | Status: ACTIVE | COMMUNITY
Start: 2022-09-20 | End: 1900-01-01

## 2023-09-14 RX ORDER — MOMETASONE 50 UG/1
50 SPRAY, METERED NASAL TWICE DAILY
Qty: 1 | Refills: 11 | Status: DISCONTINUED | COMMUNITY
Start: 2022-10-05 | End: 2023-09-14

## 2023-10-09 ENCOUNTER — APPOINTMENT (OUTPATIENT)
Dept: NEUROLOGY | Facility: CLINIC | Age: 63
End: 2023-10-09
Payer: COMMERCIAL

## 2023-10-09 VITALS
HEIGHT: 66 IN | SYSTOLIC BLOOD PRESSURE: 114 MMHG | HEART RATE: 99 BPM | DIASTOLIC BLOOD PRESSURE: 68 MMHG | BODY MASS INDEX: 20.89 KG/M2 | WEIGHT: 130 LBS

## 2023-10-09 DIAGNOSIS — M54.2 CERVICALGIA: ICD-10-CM

## 2023-10-09 DIAGNOSIS — G43.019 MIGRAINE W/OUT AURA, INTRACTABLE, W/OUT STATUS MIGRAINOSUS: ICD-10-CM

## 2023-10-09 PROCEDURE — 99214 OFFICE O/P EST MOD 30 MIN: CPT

## 2023-12-29 RX ORDER — ATORVASTATIN CALCIUM 10 MG/1
10 TABLET, FILM COATED ORAL
Qty: 90 | Refills: 3 | Status: ACTIVE | COMMUNITY
Start: 2021-01-12 | End: 1900-01-01

## 2024-04-02 ENCOUNTER — APPOINTMENT (OUTPATIENT)
Dept: NEUROLOGY | Facility: CLINIC | Age: 64
End: 2024-04-02

## 2024-04-11 ENCOUNTER — APPOINTMENT (OUTPATIENT)
Dept: INTERNAL MEDICINE | Facility: CLINIC | Age: 64
End: 2024-04-11
Payer: COMMERCIAL

## 2024-04-11 VITALS
WEIGHT: 133 LBS | RESPIRATION RATE: 16 BRPM | BODY MASS INDEX: 21.38 KG/M2 | SYSTOLIC BLOOD PRESSURE: 127 MMHG | HEIGHT: 66 IN | OXYGEN SATURATION: 97 % | DIASTOLIC BLOOD PRESSURE: 74 MMHG | TEMPERATURE: 97.4 F | HEART RATE: 89 BPM

## 2024-04-11 DIAGNOSIS — J47.9 BRONCHIECTASIS, UNCOMPLICATED: ICD-10-CM

## 2024-04-11 DIAGNOSIS — D86.9 SARCOIDOSIS, UNSPECIFIED: ICD-10-CM

## 2024-04-11 DIAGNOSIS — E78.00 PURE HYPERCHOLESTEROLEMIA, UNSPECIFIED: ICD-10-CM

## 2024-04-11 DIAGNOSIS — Z00.00 ENCOUNTER FOR GENERAL ADULT MEDICAL EXAMINATION W/OUT ABNORMAL FINDINGS: ICD-10-CM

## 2024-04-11 DIAGNOSIS — J45.30 MILD PERSISTENT ASTHMA, UNCOMPLICATED: ICD-10-CM

## 2024-04-11 PROCEDURE — 94729 DIFFUSING CAPACITY: CPT

## 2024-04-11 PROCEDURE — ZZZZZ: CPT

## 2024-04-11 PROCEDURE — 94060 EVALUATION OF WHEEZING: CPT

## 2024-04-11 PROCEDURE — 94727 GAS DIL/WSHOT DETER LNG VOL: CPT

## 2024-04-11 PROCEDURE — 99214 OFFICE O/P EST MOD 30 MIN: CPT | Mod: 25

## 2024-04-11 RX ORDER — UBROGEPANT 100 MG/1
100 TABLET ORAL
Qty: 8 | Refills: 2 | Status: DISCONTINUED | COMMUNITY
Start: 2023-10-09 | End: 2024-04-11

## 2024-04-11 RX ORDER — B2/MAGNESIUM CIT,OXID/FEVERFEW 200-180-50
200-180-50 TABLET ORAL
Qty: 90 | Refills: 0 | Status: DISCONTINUED | COMMUNITY
Start: 2023-10-09 | End: 2024-04-11

## 2024-04-11 NOTE — PHYSICAL EXAM
[General Appearance - Alert] : alert [General Appearance - In No Acute Distress] : in no acute distress [Outer Ear] : the ears and nose were normal in appearance [Oropharynx] : the oropharynx was normal [Neck Appearance] : the appearance of the neck was normal [Neck Cervical Mass (___cm)] : no neck mass was observed [Jugular Venous Distention Increased] : there was no jugular-venous distention [Thyroid Diffuse Enlargement] : the thyroid was not enlarged [Thyroid Nodule] : there were no palpable thyroid nodules [Auscultation Breath Sounds / Voice Sounds] : lungs were clear to auscultation bilaterally [Heart Rate And Rhythm] : heart rate was normal and rhythm regular [Heart Sounds] : normal S1 and S2 [Heart Sounds Gallop] : no gallops [Murmurs] : no murmurs [Heart Sounds Pericardial Friction Rub] : no pericardial rub [Arterial Pulses Carotid] : carotid pulses were normal with no bruits [Edema] : there was no peripheral edema [Bowel Sounds] : normal bowel sounds [Abdomen Soft] : soft [Abdomen Tenderness] : non-tender [] : no hepato-splenomegaly [Abdomen Mass (___ Cm)] : no abdominal mass palpated [Cervical Lymph Nodes Enlarged Posterior Bilaterally] : posterior cervical [Cervical Lymph Nodes Enlarged Anterior Bilaterally] : anterior cervical [Supraclavicular Lymph Nodes Enlarged Bilaterally] : supraclavicular [Nail Clubbing] : no clubbing  or cyanosis of the fingernails [Coordination Grossly Intact] : coordination grossly intact [Normal Gait] : normal gait [Normal Affect] : the affect was normal [Normal Insight/Judgement] : insight and judgment were intact

## 2024-04-11 NOTE — PLAN
[FreeTextEntry1] : 1. Continue current medications as outlined above.   2. Follow up in 6 months with wellness and routine bloodwork.     3. Patient will undergo a routine surveillance colonoscopy at her earliest convenience.  I have again stressed to her the importance of getting this elective procedure done.  There is a family history of colon cancer.  4. Patient will consider a repeat CAT scan of the chest in March 2025.    5. Though the patient refused, the COVID and Influenza vaccinations are recommended at this time.  6. Though the patient refused, the patient is recommended to raise her dosage of lipitor to 20 mg/day due to continued elevation of LDL cholesterol above target.    7. Maintain exercise regimen as tolerated.

## 2024-04-11 NOTE — DATA REVIEWED
[FreeTextEntry1] : A pulmonary function test is performed.  Lung volumes reveal normal total lung capacity and vital capacity.  The RV and FRC are mildly reduced.  Lung mechanics reveal a mild decrease in flow rates.  Bronchodilator reactivity is not assessed.  The DLCO and saturation are maintained.  This represents combination of both restrictive and obstructive processes.  The restriction may be due to her history of sarcoid.  Mild airway obstruction and narrowing is noted.  When compared to the prior studies, however, the flow rates are stable.

## 2024-04-11 NOTE — HISTORY OF PRESENT ILLNESS
[FreeTextEntry1] :  The patient comes in today for a follow-up pulmonary assessment. [de-identified] : Ms. BABITA ARANGO is feeling relatively well at this time. The patient has a longstanding history of bronchiectasis and underlying sarcoidosis.  A chest CT in March 2023 returned stable.  The patient additionally has mild chronic asthma and airway inflammation. There have been no exacerbations as of late.  There has been no cough, sputum production, or wheezing. She has not required the use of a rescue inhaler. The patient has not been exercising. She continues to take advair 1 puff BiD.   She also has a history of Hyperlipidemia and continues to take atorvastatin 10 mg/day. During her last visit, the patient was advised to raise her atorvastatin dose to 20 mg/day, though the patient refused. She is generally tolerating the statin agent well, denying any severe muscle aches or any other overt symptoms. There has been no chest pain, shortness of breath, palpitations, or PND.  The patient has a history of GERD, and continues to take famotidine 20 mg/day.  She denies any changes to appetite or bowel changes at this time. The patient is 2 years overdue for a routine surveillance colonoscopy.     The patient refuses to receive the COVID and Influenza vaccines at this time. There have been no cough, fevers, chills, or night sweats. There have been no other acute constitutional symptoms. She comes in for this assessment.

## 2024-06-06 ENCOUNTER — RX RENEWAL (OUTPATIENT)
Age: 64
End: 2024-06-06

## 2024-06-06 RX ORDER — FLUTICASONE PROPIONATE AND SALMETEROL 100; 50 UG/1; UG/1
100-50 POWDER RESPIRATORY (INHALATION)
Qty: 60 | Refills: 0 | Status: ACTIVE | COMMUNITY
Start: 2024-06-06 | End: 1900-01-01

## 2024-06-06 RX ORDER — FLUTICASONE PROPIONATE AND SALMETEROL 100; 50 UG/1; UG/1
100-50 POWDER RESPIRATORY (INHALATION) TWICE DAILY
Qty: 3 | Refills: 3 | Status: ACTIVE | COMMUNITY
Start: 2023-04-10 | End: 1900-01-01

## 2024-09-24 ENCOUNTER — APPOINTMENT (OUTPATIENT)
Dept: BREAST CENTER | Facility: CLINIC | Age: 64
End: 2024-09-24
Payer: COMMERCIAL

## 2024-09-24 VITALS — BODY MASS INDEX: 21.99 KG/M2 | RESPIRATION RATE: 16 BRPM | WEIGHT: 132 LBS | HEIGHT: 65 IN

## 2024-09-24 DIAGNOSIS — R92.8 OTHER ABNORMAL AND INCONCLUSIVE FINDINGS ON DIAGNOSTIC IMAGING OF BREAST: ICD-10-CM

## 2024-09-24 DIAGNOSIS — R92.0 MAMMOGRAPHIC MICROCALCIFICATION FOUND ON DIAGNOSTIC IMAGING OF BREAST: ICD-10-CM

## 2024-09-24 PROCEDURE — 99204 OFFICE O/P NEW MOD 45 MIN: CPT | Mod: 25

## 2024-09-24 PROCEDURE — 76641 ULTRASOUND BREAST COMPLETE: CPT | Mod: 50

## 2024-09-24 NOTE — ASSESSMENT
[FreeTextEntry1] : Indeterminate microcalcifications right breast  Patient assisted in scheduling diagnostic right mammogram  Possible stereotactic right breast biopsy  There are no suspicious findings on ultrasound  A total of 45 minutes was spent in consultation

## 2024-09-24 NOTE — HISTORY OF PRESENT ILLNESS
[FreeTextEntry1] : Patient recently had mammogram performed at [facility] 1 week ago and suggested additional imaging.   Denies palpable mass, breast pain, nipple discharge, redness on the skin.  Maternal first cousin in her early 30s had breast cancer.   Indeterminate microcalcifications are reported and diagnostic imaging is suggested

## 2024-09-24 NOTE — PROCEDURE
[FreeTextEntry3] : Bilateral breast ultrasound  The patient has a history for dense breast tissue  Four-quadrant survey the right breast demonstrates no developing mass  Four-quadrant survey the left breast demonstrates no developing mass  BI-RADS 2

## 2024-09-25 ENCOUNTER — APPOINTMENT (OUTPATIENT)
Dept: MAMMOGRAPHY | Facility: CLINIC | Age: 64
End: 2024-09-25
Payer: COMMERCIAL

## 2024-09-25 ENCOUNTER — RESULT REVIEW (OUTPATIENT)
Age: 64
End: 2024-09-25

## 2024-09-25 ENCOUNTER — OUTPATIENT (OUTPATIENT)
Dept: OUTPATIENT SERVICES | Facility: HOSPITAL | Age: 64
LOS: 1 days | End: 2024-09-25
Payer: COMMERCIAL

## 2024-09-25 DIAGNOSIS — Z00.8 ENCOUNTER FOR OTHER GENERAL EXAMINATION: ICD-10-CM

## 2024-09-25 DIAGNOSIS — R92.8 OTHER ABNORMAL AND INCONCLUSIVE FINDINGS ON DIAGNOSTIC IMAGING OF BREAST: ICD-10-CM

## 2024-09-25 PROCEDURE — G0279: CPT

## 2024-09-25 PROCEDURE — 77065 DX MAMMO INCL CAD UNI: CPT

## 2024-09-25 PROCEDURE — 77065 DX MAMMO INCL CAD UNI: CPT | Mod: 26,RT

## 2024-09-25 PROCEDURE — G0279: CPT | Mod: 26

## 2024-10-09 ENCOUNTER — RX RENEWAL (OUTPATIENT)
Age: 64
End: 2024-10-09

## 2024-10-31 ENCOUNTER — LABORATORY RESULT (OUTPATIENT)
Age: 64
End: 2024-10-31

## 2024-10-31 ENCOUNTER — NON-APPOINTMENT (OUTPATIENT)
Age: 64
End: 2024-10-31

## 2024-11-14 ENCOUNTER — APPOINTMENT (OUTPATIENT)
Dept: INTERNAL MEDICINE | Facility: CLINIC | Age: 64
End: 2024-11-14
Payer: COMMERCIAL

## 2024-11-14 VITALS
WEIGHT: 135 LBS | DIASTOLIC BLOOD PRESSURE: 80 MMHG | BODY MASS INDEX: 22.49 KG/M2 | TEMPERATURE: 97.7 F | HEIGHT: 65 IN | HEART RATE: 100 BPM | RESPIRATION RATE: 15 BRPM | SYSTOLIC BLOOD PRESSURE: 110 MMHG | OXYGEN SATURATION: 98 %

## 2024-11-14 DIAGNOSIS — Z00.00 ENCOUNTER FOR GENERAL ADULT MEDICAL EXAMINATION W/OUT ABNORMAL FINDINGS: ICD-10-CM

## 2024-11-14 PROCEDURE — 99396 PREV VISIT EST AGE 40-64: CPT

## 2024-12-02 ENCOUNTER — RX RENEWAL (OUTPATIENT)
Age: 64
End: 2024-12-02

## 2025-01-03 ENCOUNTER — APPOINTMENT (OUTPATIENT)
Dept: INTERNAL MEDICINE | Facility: CLINIC | Age: 65
End: 2025-01-03
Payer: COMMERCIAL

## 2025-01-03 ENCOUNTER — NON-APPOINTMENT (OUTPATIENT)
Age: 65
End: 2025-01-03

## 2025-01-03 VITALS
HEIGHT: 65 IN | OXYGEN SATURATION: 97 % | SYSTOLIC BLOOD PRESSURE: 122 MMHG | WEIGHT: 135.19 LBS | BODY MASS INDEX: 22.52 KG/M2 | TEMPERATURE: 97.8 F | DIASTOLIC BLOOD PRESSURE: 82 MMHG | HEART RATE: 103 BPM

## 2025-01-03 DIAGNOSIS — J06.9 ACUTE UPPER RESPIRATORY INFECTION, UNSPECIFIED: ICD-10-CM

## 2025-01-03 PROCEDURE — 95012 NITRIC OXIDE EXP GAS DETER: CPT

## 2025-01-03 PROCEDURE — 99213 OFFICE O/P EST LOW 20 MIN: CPT | Mod: 25

## 2025-01-03 PROCEDURE — 94060 EVALUATION OF WHEEZING: CPT

## 2025-01-06 LAB
INFLUENZA A RESULT: NOT DETECTED
INFLUENZA B RESULT: NOT DETECTED
RESP SYN VIRUS RESULT: NOT DETECTED
SARS-COV-2 RESULT: DETECTED

## 2025-02-25 ENCOUNTER — APPOINTMENT (OUTPATIENT)
Dept: ORTHOPEDIC SURGERY | Facility: CLINIC | Age: 65
End: 2025-02-25

## 2025-02-28 ENCOUNTER — APPOINTMENT (OUTPATIENT)
Dept: RADIOLOGY | Facility: CLINIC | Age: 65
End: 2025-02-28

## 2025-03-21 ENCOUNTER — RESULT REVIEW (OUTPATIENT)
Age: 65
End: 2025-03-21

## 2025-03-21 ENCOUNTER — APPOINTMENT (OUTPATIENT)
Dept: MAMMOGRAPHY | Facility: CLINIC | Age: 65
End: 2025-03-21
Payer: COMMERCIAL

## 2025-03-21 ENCOUNTER — OUTPATIENT (OUTPATIENT)
Dept: OUTPATIENT SERVICES | Facility: HOSPITAL | Age: 65
LOS: 1 days | End: 2025-03-21
Payer: COMMERCIAL

## 2025-03-21 DIAGNOSIS — Z00.8 ENCOUNTER FOR OTHER GENERAL EXAMINATION: ICD-10-CM

## 2025-03-21 PROCEDURE — 77065 DX MAMMO INCL CAD UNI: CPT | Mod: 26,RT

## 2025-03-21 PROCEDURE — 77065 DX MAMMO INCL CAD UNI: CPT

## 2025-03-21 PROCEDURE — G0279: CPT

## 2025-03-21 PROCEDURE — G0279: CPT | Mod: 26

## 2025-06-05 ENCOUNTER — APPOINTMENT (OUTPATIENT)
Dept: INTERNAL MEDICINE | Facility: CLINIC | Age: 65
End: 2025-06-05
Payer: COMMERCIAL

## 2025-06-05 DIAGNOSIS — R91.8 OTHER NONSPECIFIC ABNORMAL FINDING OF LUNG FIELD: ICD-10-CM

## 2025-06-05 DIAGNOSIS — J47.9 BRONCHIECTASIS, UNCOMPLICATED: ICD-10-CM

## 2025-06-05 DIAGNOSIS — J98.4 OTHER DISORDERS OF LUNG: ICD-10-CM

## 2025-06-05 DIAGNOSIS — D86.9 SARCOIDOSIS, UNSPECIFIED: ICD-10-CM

## 2025-06-05 DIAGNOSIS — Z00.00 ENCOUNTER FOR GENERAL ADULT MEDICAL EXAMINATION W/OUT ABNORMAL FINDINGS: ICD-10-CM

## 2025-06-05 DIAGNOSIS — J45.30 MILD PERSISTENT ASTHMA, UNCOMPLICATED: ICD-10-CM

## 2025-06-05 DIAGNOSIS — E55.9 VITAMIN D DEFICIENCY, UNSPECIFIED: ICD-10-CM

## 2025-06-05 PROCEDURE — 99214 OFFICE O/P EST MOD 30 MIN: CPT | Mod: 25

## 2025-06-05 PROCEDURE — 94729 DIFFUSING CAPACITY: CPT

## 2025-06-05 PROCEDURE — 94060 EVALUATION OF WHEEZING: CPT

## 2025-06-05 PROCEDURE — 95012 NITRIC OXIDE EXP GAS DETER: CPT

## 2025-06-05 PROCEDURE — ZZZZZ: CPT
